# Patient Record
Sex: FEMALE | Race: WHITE | NOT HISPANIC OR LATINO | Employment: UNEMPLOYED | URBAN - METROPOLITAN AREA
[De-identification: names, ages, dates, MRNs, and addresses within clinical notes are randomized per-mention and may not be internally consistent; named-entity substitution may affect disease eponyms.]

---

## 2017-09-13 ENCOUNTER — OFFICE VISIT (OUTPATIENT)
Dept: FAMILY MEDICINE | Facility: CLINIC | Age: 37
End: 2017-09-13
Payer: COMMERCIAL

## 2017-09-13 ENCOUNTER — TELEPHONE (OUTPATIENT)
Dept: FAMILY MEDICINE | Facility: CLINIC | Age: 37
End: 2017-09-13

## 2017-09-13 VITALS
SYSTOLIC BLOOD PRESSURE: 118 MMHG | OXYGEN SATURATION: 99 % | WEIGHT: 174.81 LBS | HEART RATE: 79 BPM | HEIGHT: 67 IN | DIASTOLIC BLOOD PRESSURE: 86 MMHG | BODY MASS INDEX: 27.44 KG/M2 | RESPIRATION RATE: 16 BRPM

## 2017-09-13 DIAGNOSIS — M25.40 SWELLING OF MULTIPLE JOINTS: Primary | ICD-10-CM

## 2017-09-13 PROCEDURE — 99213 OFFICE O/P EST LOW 20 MIN: CPT | Mod: 25,S$GLB,, | Performed by: FAMILY MEDICINE

## 2017-09-13 PROCEDURE — 96372 THER/PROPH/DIAG INJ SC/IM: CPT | Mod: S$GLB,,, | Performed by: FAMILY MEDICINE

## 2017-09-13 PROCEDURE — 99999 PR PBB SHADOW E&M-EST. PATIENT-LVL III: CPT | Mod: PBBFAC,,, | Performed by: FAMILY MEDICINE

## 2017-09-13 PROCEDURE — 3008F BODY MASS INDEX DOCD: CPT | Mod: S$GLB,,, | Performed by: FAMILY MEDICINE

## 2017-09-13 RX ORDER — BETAMETHASONE SODIUM PHOSPHATE AND BETAMETHASONE ACETATE 3; 3 MG/ML; MG/ML
6 INJECTION, SUSPENSION INTRA-ARTICULAR; INTRALESIONAL; INTRAMUSCULAR; SOFT TISSUE
Status: COMPLETED | OUTPATIENT
Start: 2017-09-13 | End: 2017-09-13

## 2017-09-13 RX ORDER — SPIRONOLACTONE 25 MG/1
25 TABLET ORAL 2 TIMES DAILY
COMMUNITY
End: 2019-07-23

## 2017-09-13 RX ORDER — OMEPRAZOLE 40 MG/1
40 CAPSULE, DELAYED RELEASE ORAL DAILY
Qty: 14 CAPSULE | Refills: 0 | Status: SHIPPED | OUTPATIENT
Start: 2017-09-13 | End: 2018-03-07

## 2017-09-13 RX ORDER — CELECOXIB 400 MG/1
400 CAPSULE ORAL 2 TIMES DAILY
Qty: 28 CAPSULE | Refills: 0 | Status: SHIPPED | OUTPATIENT
Start: 2017-09-13 | End: 2017-12-04

## 2017-09-13 RX ADMIN — BETAMETHASONE SODIUM PHOSPHATE AND BETAMETHASONE ACETATE 6 MG: 3; 3 INJECTION, SUSPENSION INTRA-ARTICULAR; INTRALESIONAL; INTRAMUSCULAR; SOFT TISSUE at 09:09

## 2017-09-13 NOTE — PROGRESS NOTES
Subjective:       Patient ID: Haley Saravia is a 37 y.o. female.    Chief Complaint: Hip Pain (left hip; happens this time of year ); Knee Pain (both knees); and Hand Pain (both hands)    Patient presents with:  Hip Pain: left hip; happens this time of year   Knee Pain: both knees  Hand Pain: both hands      Here to f/u on joint pains.  This has gotten worse in the last 2 weeks.  Similar episode las tyear at this time.  Worse areas are left hip, right thumb, both knees.  No obvious sweling.  Joints feels more tight.  Hip xrays last year have been normal.          Past Medical History:   Diagnosis Date    Abnormal Pap smear          Acne     Dyspareunia     Encounter for long-term (current) use of other medications     GERD (gastroesophageal reflux disease)     Pelvic pain in female     LLQ    Post herpetic neuralgia        Past Surgical History:   Procedure Laterality Date    CYSTOSCOPY  1/11/2010    [facility]    ESOPHAGOGASTRODUODENOSCOPY  6/05/2009    Dr [facility] Caballero [facility]    HYSTERECTOMY  1/2015    LEEP PROCEDURE      SALPINGOOPHORECTOMY  1/11/2010    Left - Dr [facility]    TONSILLECTOMY  07/10/2008    Dr DIANA Avila - [facility]       Review of patient's allergies indicates:   Allergen Reactions    Codeine Diarrhea    Medrol [methylprednisolone] Other (See Comments)     Uncontrollable coughing fits       Social History     Social History    Marital status:      Spouse name: N/A    Number of children: 1    Years of education: N/A     Occupational History    unemployed      Social History Main Topics    Smoking status: Former Smoker     Packs/day: 0.50     Years: 3.00    Smokeless tobacco: Never Used    Alcohol use No    Drug use: No    Sexual activity: Yes     Partners: Male     Birth control/ protection: OCP     Other Topics Concern    Not on file     Social History Narrative    Exercise: 3 days a week with        Current Outpatient Prescriptions  "on File Prior to Visit   Medication Sig Dispense Refill    EPIDUO 0.1-2.5 % topical gel       esomeprazole (NEXIUM) 20 MG capsule Take 20 mg by mouth before breakfast.      multivitamin (THERAGRAN) per tablet Take 1 tablet by mouth once daily.      vitamin E 400 UNIT capsule Take 400 Units by mouth once daily.      [DISCONTINUED] meloxicam (MOBIC) 15 MG tablet Take 1 tablet (15 mg total) by mouth once daily. 30 tablet 5    [DISCONTINUED] naproxen (NAPROSYN) 500 MG tablet Take 1 tablet (500 mg total) by mouth 2 (two) times daily with meals. 30 tablet 0     No current facility-administered medications on file prior to visit.        Family History   Problem Relation Age of Onset    Heart disease Father     Breast cancer Neg Hx     Ovarian cancer Neg Hx        Review of Systems   Constitutional: Negative for activity change and unexpected weight change.   HENT: Negative for hearing loss, rhinorrhea and trouble swallowing.    Eyes: Negative for discharge and visual disturbance.   Respiratory: Negative for chest tightness and wheezing.    Cardiovascular: Negative for chest pain and palpitations.   Gastrointestinal: Negative for blood in stool, constipation, diarrhea and vomiting.   Endocrine: Negative for polydipsia and polyuria.   Genitourinary: Negative for difficulty urinating, dysuria, hematuria and menstrual problem.   Musculoskeletal: Positive for arthralgias. Negative for joint swelling and neck pain.   Neurological: Negative for weakness and headaches.   Psychiatric/Behavioral: Negative for confusion and dysphoric mood.       Objective:      /86 (BP Location: Right arm, Patient Position: Sitting, BP Method: Medium (Manual))   Pulse 79   Resp 16   Ht 5' 7" (1.702 m)   Wt 79.3 kg (174 lb 13.2 oz)   LMP 12/08/2014   SpO2 99%   BMI 27.38 kg/m²   Physical Exam   Constitutional: She is oriented to person, place, and time. She appears well-developed and well-nourished.   HENT:   Head: Normocephalic. "   Mouth/Throat: Oropharynx is clear and moist. No oropharyngeal exudate or posterior oropharyngeal erythema.   Eyes: Conjunctivae and EOM are normal. Pupils are equal, round, and reactive to light.   Neck: Normal range of motion. Neck supple. No thyromegaly present.   Cardiovascular: Normal rate, regular rhythm, S1 normal, S2 normal, normal heart sounds and intact distal pulses.  Exam reveals no gallop and no friction rub.    No murmur heard.  Pulmonary/Chest: Effort normal and breath sounds normal. She has no wheezes. She has no rales.   Abdominal: Normal appearance.   Musculoskeletal:        Left hip: She exhibits normal range of motion.        Right knee: She exhibits normal range of motion. Tenderness found. Medial joint line and lateral joint line tenderness noted.        Left knee: She exhibits normal range of motion. Tenderness found. Medial joint line and lateral joint line tenderness noted.        Hands:       Right lower leg: She exhibits no edema.        Left lower leg: She exhibits no edema.        Legs:  Lymphadenopathy:     She has no cervical adenopathy.   Neurological: She is alert and oriented to person, place, and time. No cranial nerve deficit. Gait normal.   Skin: Skin is warm and intact. No rash noted.   Psychiatric: She has a normal mood and affect.       Results for orders placed or performed in visit on 09/29/16   TSH   Result Value Ref Range    TSH 2.962 0.400 - 4.000 uIU/mL   CBC auto differential   Result Value Ref Range    WBC 8.24 3.90 - 12.70 K/uL    RBC 4.79 4.00 - 5.40 M/uL    Hemoglobin 15.3 12.0 - 16.0 g/dL    Hematocrit 44.1 37.0 - 48.5 %    MCV 92 82 - 98 fL    MCH 31.9 (H) 27.0 - 31.0 pg    MCHC 34.7 32.0 - 36.0 %    RDW 12.5 11.5 - 14.5 %    Platelets 328 150 - 350 K/uL    MPV 9.7 9.2 - 12.9 fL    Gran # 4.8 1.8 - 7.7 K/uL    Lymph # 2.6 1.0 - 4.8 K/uL    Mono # 0.7 0.3 - 1.0 K/uL    Eos # 0.1 0.0 - 0.5 K/uL    Baso # 0.04 0.00 - 0.20 K/uL    Gran% 57.6 38.0 - 73.0 %    Lymph%  31.1 18.0 - 48.0 %    Mono% 8.9 4.0 - 15.0 %    Eosinophil% 1.7 0.0 - 8.0 %    Basophil% 0.5 0.0 - 1.9 %    Differential Method Automated    APTT   Result Value Ref Range    aPTT 23.9 21.0 - 32.0 sec   Protime-INR   Result Value Ref Range    Prothrombin Time 9.8 9.0 - 12.5 sec    INR 0.9 0.8 - 1.2       Assessment:       1. Swelling of multiple joints        Plan:       Swelling of multiple joints  -     omeprazole (PRILOSEC) 40 MG capsule; Take 1 capsule (40 mg total) by mouth once daily.  Dispense: 14 capsule; Refill: 0  -     celecoxib (CELEBREX) 400 MG capsule; Take 1 capsule (400 mg total) by mouth 2 (two) times daily.  Dispense: 28 capsule; Refill: 0  -     betamethasone acetate-betamethasone sodium phosphate injection 6 mg; Inject 1 mL (6 mg total) into the muscle one time.      likely mild arthritis flare caused by weather  Basic joint stretching and relative rest  NSAID treatment for 1-2 weeks  F/u PRN

## 2017-09-13 NOTE — TELEPHONE ENCOUNTER
Celebrex 400 mg not available, the have Celebrex 200 mg on hand, asking if can dispense Celebrex 200 mg, quantity 56, 2 capsules twice daily.  Patient's copay will not be affected.  Please advise.

## 2017-09-13 NOTE — TELEPHONE ENCOUNTER
----- Message from [place] sent at 9/13/2017  1:12 PM CDT -----  Contact: pharmacy   Wants dose changed on RX   .  [id number] [place] - [address]  [address]  [place] [address]  Phone: [phone] Fax: [phone]     celecoxib (CELEBREX) 400 MG capsule

## 2017-12-04 ENCOUNTER — OFFICE VISIT (OUTPATIENT)
Dept: FAMILY MEDICINE | Facility: CLINIC | Age: 37
End: 2017-12-04
Payer: COMMERCIAL

## 2017-12-04 VITALS
OXYGEN SATURATION: 99 % | HEIGHT: 67 IN | SYSTOLIC BLOOD PRESSURE: 106 MMHG | WEIGHT: 169.44 LBS | RESPIRATION RATE: 16 BRPM | TEMPERATURE: 99 F | DIASTOLIC BLOOD PRESSURE: 74 MMHG | HEART RATE: 73 BPM | BODY MASS INDEX: 26.6 KG/M2

## 2017-12-04 DIAGNOSIS — B02.29 POST HERPETIC NEURALGIA: ICD-10-CM

## 2017-12-04 DIAGNOSIS — R52 PAIN: ICD-10-CM

## 2017-12-04 DIAGNOSIS — B02.8 HERPES ZOSTER WITH COMPLICATION: Primary | ICD-10-CM

## 2017-12-04 PROCEDURE — 99214 OFFICE O/P EST MOD 30 MIN: CPT | Mod: S$GLB,,, | Performed by: NURSE PRACTITIONER

## 2017-12-04 PROCEDURE — 99999 PR PBB SHADOW E&M-EST. PATIENT-LVL IV: CPT | Mod: PBBFAC,,, | Performed by: NURSE PRACTITIONER

## 2017-12-04 RX ORDER — HYDROCODONE BITARTRATE AND ACETAMINOPHEN 7.5; 325 MG/1; MG/1
TABLET ORAL
Refills: 0 | COMMUNITY
Start: 2017-11-09 | End: 2017-12-04 | Stop reason: DRUGHIGH

## 2017-12-04 RX ORDER — HYDROCODONE BITARTRATE AND ACETAMINOPHEN 10; 325 MG/1; MG/1
1 TABLET ORAL EVERY 6 HOURS PRN
Qty: 20 TABLET | Refills: 0 | Status: SHIPPED | OUTPATIENT
Start: 2017-12-04 | End: 2017-12-07 | Stop reason: SDUPTHER

## 2017-12-04 RX ORDER — ALBUTEROL SULFATE 90 UG/1
AEROSOL, METERED RESPIRATORY (INHALATION)
Refills: 2 | COMMUNITY
Start: 2017-10-22 | End: 2018-03-07

## 2017-12-04 RX ORDER — VALACYCLOVIR HYDROCHLORIDE 1 G/1
1000 TABLET, FILM COATED ORAL EVERY 8 HOURS
Qty: 21 TABLET | Refills: 1 | Status: SHIPPED | OUTPATIENT
Start: 2017-12-04 | End: 2018-04-24 | Stop reason: SDUPTHER

## 2017-12-04 NOTE — PROGRESS NOTES
Subjective:       Patient ID: Haley Saravia is a 37 y.o. female.    Chief Complaint: Herpes Zoster (rash started Saturday; left back and side )    June 2017 -shingles on face   Has post herpetic neuralgia in the past - took pain meds and has gotten them at age 25      Rash   This is a recurrent problem. The current episode started in the past 7 days. The problem has been gradually worsening since onset. The affected locations include theback. The rash is characterized by blistering, burning, pain and itchiness. She was exposed to nothing. Pertinent negatives include no anorexia, congestion, cough, diarrhea, eye pain, facial edema, fatigue, fever, joint pain, nail changes, rhinorrhea, shortness of breath, sore throat or vomiting. Past treatments include analgesics. The treatment provided mild relief. Her past medical history is significant for varicella. There is no history of allergies, asthma or eczema.     Vitals:    12/04/17 0954   BP: 106/74   Pulse: 73   Resp: 16   Temp: 98.6 °F (37 °C)     Review of Systems   Constitutional: Negative.  Negative for diaphoresis, fatigue and fever.   HENT: Negative.  Negative for congestion, rhinorrhea and sore throat.    Eyes: Negative.  Negative for pain.   Respiratory: Negative.  Negative for cough, shortness of breath and wheezing.    Cardiovascular: Negative.  Negative for chest pain.   Gastrointestinal: Negative.  Negative for abdominal pain, anorexia, diarrhea, nausea and vomiting.   Endocrine: Negative.    Genitourinary: Negative.  Negative for dysuria, hematuria and menstrual problem.   Musculoskeletal: Positive for joint swelling. Negative for joint pain.   Skin: Positive for rash. Negative for color change and nail changes.   Allergic/Immunologic: Negative.    Neurological: Positive for numbness. Negative for speech difficulty.   Hematological: Negative.    Psychiatric/Behavioral: Negative.        Past Medical History:   Diagnosis Date    Abnormal Pap smear           Acne     Dyspareunia     Encounter for long-term (current) use of other medications     GERD (gastroesophageal reflux disease)     Pelvic pain in female     LLQ    Post herpetic neuralgia      Objective:      Physical Exam   Constitutional: She is oriented to person, place, and time. She appears well-developed and well-nourished.   HENT:   Head: Normocephalic and atraumatic.   Mouth/Throat: Oropharynx is clear and moist.   Eyes: Conjunctivae and EOM are normal. Pupils are equal, round, and reactive to light.   Neck: Neck supple.   Cardiovascular: Normal rate, regular rhythm, normal heart sounds and intact distal pulses.    Pulmonary/Chest: Effort normal and breath sounds normal.   Abdominal: Soft. Bowel sounds are normal.   Musculoskeletal: Normal range of motion.   Neurological: She is alert and oriented to person, place, and time.   Skin: Skin is warm and dry. Purpura and rash noted. Rash is vesicular.        Psychiatric: She has a normal mood and affect. Her behavior is normal.   Nursing note and vitals reviewed.      Assessment:       1. Herpes zoster with complication    2. Post herpetic neuralgia    3. Pain        Plan:       Herpes zoster with complication  -     valACYclovir (VALTREX) 1000 MG tablet; Take 1 tablet (1,000 mg total) by mouth every 8 (eight) hours.  Dispense: 21 tablet; Refill: 1  -     hydrocodone-acetaminophen 10-325mg (NORCO)  mg Tab; Take 1 tablet by mouth every 6 (six) hours as needed for Pain.  Dispense: 20 tablet; Refill: 0    Post herpetic neuralgia  Pain    Will give norco for acute pain - she states that she has tried other post neuralgia meds and all have had side effects with her that were not manageable   Only wants to use norco at this time for very short time to get over acute flare   Will call if not better

## 2017-12-07 DIAGNOSIS — B02.8 HERPES ZOSTER WITH COMPLICATION: ICD-10-CM

## 2017-12-07 NOTE — TELEPHONE ENCOUNTER
----- Message from Liat Gonzales sent at 12/7/2017  4:15 PM CST -----  Contact: self  Patient called regarding previous appt on Monday for shingles. Advised if more medication is needed with have Dr. Kiran to forward to pharmacy. Please contact [phone] (home)       [facility] [id number] [facility] - [place] - [address]  [address]  [facility] [address]  Phone: [phone] Fax: [phone]

## 2017-12-07 NOTE — TELEPHONE ENCOUNTER
Spoke to patient. Patient states she needs more medication for pain for shingles before the weekend. She states she still has some left but didn't want to run out over the weekend. Please advise.

## 2017-12-08 RX ORDER — HYDROCODONE BITARTRATE AND ACETAMINOPHEN 10; 325 MG/1; MG/1
1 TABLET ORAL EVERY 6 HOURS PRN
Qty: 20 TABLET | Refills: 0 | Status: SHIPPED | OUTPATIENT
Start: 2017-12-08 | End: 2017-12-12 | Stop reason: SDUPTHER

## 2017-12-12 DIAGNOSIS — B02.8 HERPES ZOSTER WITH COMPLICATION: ICD-10-CM

## 2017-12-12 NOTE — TELEPHONE ENCOUNTER
Pt is requesting another refill on Norco. Pt has 1 day left of pills from the rx filled on 12-7-17. Pt is leaving [facility] marko afternoon and would like refill before then .--lp

## 2017-12-13 RX ORDER — HYDROCODONE BITARTRATE AND ACETAMINOPHEN 10; 325 MG/1; MG/1
1 TABLET ORAL EVERY 6 HOURS PRN
Qty: 20 TABLET | Refills: 0 | Status: SHIPPED | OUTPATIENT
Start: 2017-12-13 | End: 2018-03-07

## 2018-03-07 ENCOUNTER — OFFICE VISIT (OUTPATIENT)
Dept: FAMILY MEDICINE | Facility: CLINIC | Age: 38
End: 2018-03-07
Payer: COMMERCIAL

## 2018-03-07 VITALS
TEMPERATURE: 98 F | SYSTOLIC BLOOD PRESSURE: 118 MMHG | RESPIRATION RATE: 16 BRPM | WEIGHT: 164 LBS | HEART RATE: 69 BPM | DIASTOLIC BLOOD PRESSURE: 76 MMHG | HEIGHT: 67 IN | BODY MASS INDEX: 25.74 KG/M2 | OXYGEN SATURATION: 98 %

## 2018-03-07 DIAGNOSIS — Z12.39 SCREENING FOR BREAST CANCER: ICD-10-CM

## 2018-03-07 DIAGNOSIS — M25.562 CHRONIC PAIN OF BOTH KNEES: Primary | ICD-10-CM

## 2018-03-07 DIAGNOSIS — Z00.00 PREVENTATIVE HEALTH CARE: ICD-10-CM

## 2018-03-07 DIAGNOSIS — K21.9 GASTROESOPHAGEAL REFLUX DISEASE, ESOPHAGITIS PRESENCE NOT SPECIFIED: ICD-10-CM

## 2018-03-07 DIAGNOSIS — M25.561 CHRONIC PAIN OF BOTH KNEES: Primary | ICD-10-CM

## 2018-03-07 DIAGNOSIS — G89.29 CHRONIC PAIN OF BOTH KNEES: Primary | ICD-10-CM

## 2018-03-07 PROCEDURE — 99214 OFFICE O/P EST MOD 30 MIN: CPT | Mod: S$GLB,,, | Performed by: NURSE PRACTITIONER

## 2018-03-07 PROCEDURE — 99999 PR PBB SHADOW E&M-EST. PATIENT-LVL V: CPT | Mod: PBBFAC,,, | Performed by: NURSE PRACTITIONER

## 2018-03-07 RX ORDER — ESOMEPRAZOLE MAGNESIUM 40 MG/1
40 CAPSULE, DELAYED RELEASE ORAL
Qty: 30 CAPSULE | Refills: 0 | Status: SHIPPED | OUTPATIENT
Start: 2018-03-07 | End: 2018-04-04 | Stop reason: SDUPTHER

## 2018-03-07 RX ORDER — CELECOXIB 400 MG/1
400 CAPSULE ORAL 2 TIMES DAILY
Qty: 28 CAPSULE | Refills: 0 | Status: SHIPPED | OUTPATIENT
Start: 2018-03-07 | End: 2018-04-24

## 2018-03-07 NOTE — PROGRESS NOTES
Subjective:       Patient ID: Haley Saravia is a 38 y.o. female.    Chief Complaint: Knee Pain (both knees )    Went skiing last week - just got home and feels that she aggravated her knees   She states the short term celebrex for 1-2 weeks worked for her in the past         Knee Pain    The incident occurred more than 1 week ago. The incident occurred at home. The pain is present in the right knee and left knee. The quality of the pain is described as aching and cramping. The pain is at a severity of 6/10. The pain is moderate. The pain has been fluctuating since onset. Pertinent negatives include no inability to bear weight, loss of motion, loss of sensation, muscle weakness, numbness or tingling. The symptoms are aggravated by movement. She has tried rest for the symptoms. The treatment provided no relief.     Vitals:    03/07/18 0903   BP: 118/76   Pulse: 69   Resp: 16   Temp: 98.1 °F (36.7 °C)     Review of Systems   Constitutional: Positive for activity change. Negative for diaphoresis, fatigue, fever and unexpected weight change.   HENT: Negative.  Negative for hearing loss, rhinorrhea and trouble swallowing.    Eyes: Negative.  Negative for discharge and visual disturbance.   Respiratory: Negative.  Negative for cough, chest tightness, shortness of breath and wheezing.    Cardiovascular: Negative.  Negative for chest pain and palpitations.   Gastrointestinal: Negative.  Negative for abdominal pain, blood in stool, constipation, diarrhea, nausea and vomiting.   Endocrine: Negative.  Negative for polydipsia and polyuria.   Genitourinary: Negative.  Negative for difficulty urinating, dysuria, hematuria and menstrual problem.   Musculoskeletal: Positive for arthralgias and joint swelling. Negative for neck pain.   Skin: Negative for color change and rash.   Allergic/Immunologic: Negative.    Neurological: Negative for tingling, speech difficulty, weakness, numbness and headaches.   Hematological:  Negative.    Psychiatric/Behavioral: Negative.  Negative for confusion and dysphoric mood.       Past Medical History:   Diagnosis Date    Abnormal Pap smear          Acne     Dyspareunia     Encounter for long-term (current) use of other medications     GERD (gastroesophageal reflux disease)     Pelvic pain in female     LLQ    Post herpetic neuralgia      Objective:      Physical Exam   Constitutional: She is oriented to person, place, and time. She appears well-developed and well-nourished.   HENT:   Head: Normocephalic and atraumatic.   Mouth/Throat: Oropharynx is clear and moist.   Eyes: Conjunctivae and EOM are normal. Pupils are equal, round, and reactive to light.   Neck: Neck supple.   Cardiovascular: Normal rate.    Pulmonary/Chest: Effort normal.   Musculoskeletal:        Right knee: She exhibits decreased range of motion. She exhibits no swelling and no effusion. No tenderness found. No medial joint line and no lateral joint line tenderness noted.        Left knee: She exhibits decreased range of motion. She exhibits no swelling and no effusion.   Neurological: She is alert and oriented to person, place, and time.   Skin: Skin is warm and dry.   Psychiatric: She has a normal mood and affect. Her behavior is normal. Judgment and thought content normal.   Nursing note and vitals reviewed.      Assessment:       1. Chronic pain of both knees    2. Gastroesophageal reflux disease, esophagitis presence not specified    3. Preventative health care    4. Screening for breast cancer        Plan:       Chronic pain of both knees  -     celecoxib (CELEBREX) 400 MG capsule; Take 1 capsule (400 mg total) by mouth 2 (two) times daily.  Dispense: 28 capsule; Refill: 0    Gastroesophageal reflux disease, esophagitis presence not specified  -     esomeprazole (NEXIUM) 40 MG capsule; Take 1 capsule (40 mg total) by mouth before breakfast.  Dispense: 30 capsule; Refill: 0    Preventative health care  -     [facility] auto  differential; Future; Expected date: 03/07/2018  -     Comprehensive metabolic panel; Future; Expected date: 03/07/2018  -     Lipid panel; Future; Expected date: 03/07/2018    Screening for breast cancer  -     Mammo Digital Screening Bilat with CAD; Future; Expected date: 03/07/2018            Will make an appt with Dr Webb for check up and FU with labs prior

## 2018-03-13 ENCOUNTER — HOSPITAL ENCOUNTER (OUTPATIENT)
Dept: RADIOLOGY | Facility: HOSPITAL | Age: 38
Discharge: HOME | End: 2018-03-13
Attending: NURSE PRACTITIONER
Payer: COMMERCIAL

## 2018-03-13 DIAGNOSIS — Z12.39 SCREENING FOR BREAST CANCER: ICD-10-CM

## 2018-03-13 PROCEDURE — 77067 SCR MAMMO BI INCL CAD: CPT | Mod: 26,,, | Performed by: RADIOLOGY

## 2018-03-13 PROCEDURE — 77063 BREAST TOMOSYNTHESIS BI: CPT | Mod: 26,,, | Performed by: RADIOLOGY

## 2018-03-13 PROCEDURE — 77067 SCR MAMMO BI INCL CAD: CPT | Mod: TC,PO

## 2018-04-04 DIAGNOSIS — K21.9 GASTROESOPHAGEAL REFLUX DISEASE, ESOPHAGITIS PRESENCE NOT SPECIFIED: ICD-10-CM

## 2018-04-04 RX ORDER — ESOMEPRAZOLE MAGNESIUM 40 MG/1
40 CAPSULE, DELAYED RELEASE ORAL
Qty: 30 CAPSULE | Refills: 0 | Status: SHIPPED | OUTPATIENT
Start: 2018-04-04 | End: 2019-07-23

## 2018-04-21 ENCOUNTER — LAB VISIT (OUTPATIENT)
Dept: LAB | Facility: HOSPITAL | Age: 38
End: 2018-04-21
Attending: FAMILY MEDICINE
Payer: COMMERCIAL

## 2018-04-21 DIAGNOSIS — Z00.00 PREVENTATIVE HEALTH CARE: ICD-10-CM

## 2018-04-21 LAB
ALBUMIN SERPL BCP-MCNC: 4 G/DL
ALP SERPL-CCNC: 39 U/L
ALT SERPL W/O P-5'-P-CCNC: 13 U/L
ANION GAP SERPL CALC-SCNC: 7 MMOL/L
AST SERPL-CCNC: 16 U/L
BASOPHILS # BLD AUTO: 0.06 K/UL
BASOPHILS NFR BLD: 0.7 %
BILIRUB SERPL-MCNC: 0.6 MG/DL
BUN SERPL-MCNC: 13 MG/DL
CALCIUM SERPL-MCNC: 9.5 MG/DL
CHLORIDE SERPL-SCNC: 102 MMOL/L
CHOLEST SERPL-MCNC: 272 MG/DL
CHOLEST/HDLC SERPL: 4.5 {RATIO}
CO2 SERPL-SCNC: 25 MMOL/L
CREAT SERPL-MCNC: 0.8 MG/DL
DIFFERENTIAL METHOD: NORMAL
EOSINOPHIL # BLD AUTO: 0.2 K/UL
EOSINOPHIL NFR BLD: 1.9 %
ERYTHROCYTE [DISTWIDTH] IN BLOOD BY AUTOMATED COUNT: 12.3 %
EST. GFR  (AFRICAN AMERICAN): >60 ML/MIN/1.73 M^2
EST. GFR  (NON AFRICAN AMERICAN): >60 ML/MIN/1.73 M^2
GLUCOSE SERPL-MCNC: 90 MG/DL
HCT VFR BLD AUTO: 42.4 %
HDLC SERPL-MCNC: 60 MG/DL
HDLC SERPL: 22.1 %
HGB BLD-MCNC: 14.3 G/DL
IMM GRANULOCYTES # BLD AUTO: 0.02 K/UL
IMM GRANULOCYTES NFR BLD AUTO: 0.2 %
LDLC SERPL CALC-MCNC: 168.4 MG/DL
LYMPHOCYTES # BLD AUTO: 2.9 K/UL
LYMPHOCYTES NFR BLD: 34.2 %
MCH RBC QN AUTO: 31 PG
MCHC RBC AUTO-ENTMCNC: 33.7 G/DL
MCV RBC AUTO: 92 FL
MONOCYTES # BLD AUTO: 0.6 K/UL
MONOCYTES NFR BLD: 6.7 %
NEUTROPHILS # BLD AUTO: 4.8 K/UL
NEUTROPHILS NFR BLD: 56.3 %
NONHDLC SERPL-MCNC: 212 MG/DL
NRBC BLD-RTO: 0 /100 WBC
PLATELET # BLD AUTO: 319 K/UL
PMV BLD AUTO: 9.8 FL
POTASSIUM SERPL-SCNC: 4.2 MMOL/L
PROT SERPL-MCNC: 7.5 G/DL
RBC # BLD AUTO: 4.61 M/UL
SODIUM SERPL-SCNC: 134 MMOL/L
TRIGL SERPL-MCNC: 218 MG/DL
WBC # BLD AUTO: 8.47 K/UL

## 2018-04-21 PROCEDURE — 80053 COMPREHEN METABOLIC PANEL: CPT

## 2018-04-21 PROCEDURE — 85025 COMPLETE CBC W/AUTO DIFF WBC: CPT

## 2018-04-21 PROCEDURE — 80061 LIPID PANEL: CPT

## 2018-04-21 PROCEDURE — 36415 COLL VENOUS BLD VENIPUNCTURE: CPT | Mod: PO

## 2018-04-24 ENCOUNTER — OFFICE VISIT (OUTPATIENT)
Dept: FAMILY MEDICINE | Facility: CLINIC | Age: 38
End: 2018-04-24
Payer: COMMERCIAL

## 2018-04-24 VITALS
RESPIRATION RATE: 18 BRPM | HEART RATE: 66 BPM | WEIGHT: 163.56 LBS | OXYGEN SATURATION: 99 % | BODY MASS INDEX: 25.67 KG/M2 | TEMPERATURE: 99 F | HEIGHT: 67 IN | DIASTOLIC BLOOD PRESSURE: 64 MMHG | SYSTOLIC BLOOD PRESSURE: 110 MMHG

## 2018-04-24 DIAGNOSIS — B02.8 HERPES ZOSTER WITH COMPLICATION: ICD-10-CM

## 2018-04-24 DIAGNOSIS — Z00.00 PREVENTATIVE HEALTH CARE: Primary | ICD-10-CM

## 2018-04-24 PROCEDURE — 99999 PR PBB SHADOW E&M-EST. PATIENT-LVL III: CPT | Mod: PBBFAC,,, | Performed by: FAMILY MEDICINE

## 2018-04-24 PROCEDURE — 99396 PREV VISIT EST AGE 40-64: CPT | Mod: S$GLB,,, | Performed by: FAMILY MEDICINE

## 2018-04-24 RX ORDER — VALACYCLOVIR HYDROCHLORIDE 1 G/1
1000 TABLET, FILM COATED ORAL DAILY
Qty: 90 TABLET | Refills: 3 | Status: SHIPPED | OUTPATIENT
Start: 2018-04-24 | End: 2019-04-02 | Stop reason: SDUPTHER

## 2018-04-24 NOTE — PROGRESS NOTES
Subjective:       Patient ID: Haley Saravia is a 38 y.o. female.    Chief Complaint: Preventative health care    Here for general exam.    Still getting left flank pain which comes and goes related to the shingles.        Past Medical History:   Diagnosis Date    Abnormal Pap smear          Acne     Dyspareunia     Encounter for long-term (current) use of other medications     GERD (gastroesophageal reflux disease)     Pelvic pain in female     LLQ    Post herpetic neuralgia        Past Surgical History:   Procedure Laterality Date    CYSTOSCOPY  1/11/2010    Dr [facility]    ESOPHAGOGASTRODUODENOSCOPY  6/05/2009    Dr [facility] Caballero - [facility]    HYSTERECTOMY  1/2015    LEEP PROCEDURE      SALPINGOOPHORECTOMY  1/11/2010    Left - Dr Kendell [facility] - [facility]    TONSILLECTOMY  07/10/2008    Dr DIANA [facility] - [facility]       Review of patient's allergies indicates:   Allergen Reactions    Codeine Diarrhea    Medrol [methylprednisolone] Other (See Comments)     Uncontrollable coughing fits       Social History     Social History    Marital status:      Spouse name: N/A    Number of children: 1    Years of education: N/A     Occupational History    unemployed      Social History Main Topics    Smoking status: Former Smoker     Packs/day: 0.50     Years: 3.00    Smokeless tobacco: Never Used    Alcohol use No    Drug use: No    Sexual activity: Yes     Partners: Male     Birth control/ protection: OCP     Other Topics Concern    Not on file     Social History Narrative    Exercise: 3 days a week with        Current Outpatient Prescriptions on File Prior to Visit   Medication Sig Dispense Refill    EPIDUO 0.1-2.5 % topical gel       esomeprazole (NEXIUM) 40 MG capsule TAKE 1 CAPSULE (40 MG TOTAL) BY MOUTH BEFORE BREAKFAST. 30 capsule 0    multivitamin (THERAGRAN) per tablet Take 1 tablet by mouth once daily.      spironolactone (ALDACTONE) 25 MG tablet Take 25 mg by mouth 2 (two)  "times daily.      vitamin E 400 UNIT capsule Take 400 Units by mouth once daily.      [DISCONTINUED] celecoxib (CELEBREX) 400 MG capsule Take 1 capsule (400 mg total) by mouth 2 (two) times daily. 28 capsule 0    [DISCONTINUED] valACYclovir (VALTREX) 1000 MG tablet Take 1 tablet (1,000 mg total) by mouth every 8 (eight) hours. 21 tablet 1     No current facility-administered medications on file prior to visit.        Family History   Problem Relation Age of Onset    Heart disease Father     Breast cancer Neg Hx     Ovarian cancer Neg Hx        Review of Systems   Constitutional: Negative for appetite change, chills, fever and unexpected weight change.   HENT: Negative for sore throat and trouble swallowing.    Eyes: Negative for pain and visual disturbance.   Respiratory: Negative for cough, shortness of breath and wheezing.    Cardiovascular: Negative for chest pain and palpitations.   Gastrointestinal: Negative for abdominal pain, blood in stool, diarrhea, nausea and vomiting.   Genitourinary: Negative for difficulty urinating, dysuria and hematuria.   Musculoskeletal: Positive for arthralgias (knees). Negative for gait problem and neck pain.   Skin: Negative for rash and wound.   Neurological: Negative for dizziness, weakness, numbness and headaches.   Hematological: Negative for adenopathy.   Psychiatric/Behavioral: Negative for dysphoric mood.       Objective:      /64   Pulse 66   Temp 98.6 °F (37 °C) (Oral)   Resp 18   Ht 5' 7" (1.702 m)   Wt 74.2 kg (163 lb 9.3 oz)   LMP 12/08/2014   SpO2 99%   BMI 25.62 kg/m²   Physical Exam   Constitutional: She is oriented to person, place, and time. She appears well-developed and well-nourished.   HENT:   Head: Normocephalic.   Mouth/Throat: Oropharynx is clear and moist. No oropharyngeal exudate or posterior oropharyngeal erythema.   Eyes: Conjunctivae and EOM are normal. Pupils are equal, round, and reactive to light.   Neck: Normal range of motion. " Neck supple. No thyromegaly present.   Cardiovascular: Normal rate, regular rhythm, S1 normal, S2 normal, normal heart sounds and intact distal pulses.  Exam reveals no gallop and no friction rub.    No murmur heard.  Pulmonary/Chest: Effort normal and breath sounds normal. She has no wheezes. She has no rales.   Abdominal: Normal appearance.   Musculoskeletal:        Right lower leg: She exhibits no edema.        Left lower leg: She exhibits no edema.   Lymphadenopathy:     She has no cervical adenopathy.   Neurological: She is alert and oriented to person, place, and time. No cranial nerve deficit. Gait normal.   Skin: Skin is warm and intact. No rash noted.   Psychiatric: She has a normal mood and affect.         Results for orders placed or performed in visit on 04/21/18   CBC auto differential   Result Value Ref Range    WBC 8.47 3.90 - 12.70 K/uL    RBC 4.61 4.00 - 5.40 M/uL    Hemoglobin 14.3 12.0 - 16.0 g/dL    Hematocrit 42.4 37.0 - 48.5 %    MCV 92 82 - 98 fL    MCH 31.0 27.0 - 31.0 pg    MCHC 33.7 32.0 - 36.0 g/dL    RDW 12.3 11.5 - 14.5 %    Platelets 319 150 - 350 K/uL    MPV 9.8 9.2 - 12.9 fL    Immature Granulocytes 0.2 0.0 - 0.5 %    Gran # (ANC) 4.8 1.8 - 7.7 K/uL    Immature Grans (Abs) 0.02 0.00 - 0.04 K/uL    Lymph # 2.9 1.0 - 4.8 K/uL    Mono # 0.6 0.3 - 1.0 K/uL    Eos # 0.2 0.0 - 0.5 K/uL    Baso # 0.06 0.00 - 0.20 K/uL    nRBC 0 0 /100 WBC    Gran% 56.3 38.0 - 73.0 %    Lymph% 34.2 18.0 - 48.0 %    Mono% 6.7 4.0 - 15.0 %    Eosinophil% 1.9 0.0 - 8.0 %    Basophil% 0.7 0.0 - 1.9 %    Differential Method Automated    Comprehensive metabolic panel   Result Value Ref Range    Sodium 134 (L) 136 - 145 mmol/L    Potassium 4.2 3.5 - 5.1 mmol/L    Chloride 102 95 - 110 mmol/L    CO2 25 23 - 29 mmol/L    Glucose 90 70 - 110 mg/dL    BUN, Bld 13 6 - 20 mg/dL    Creatinine 0.8 0.5 - 1.4 mg/dL    Calcium 9.5 8.7 - 10.5 mg/dL    Total Protein 7.5 6.0 - 8.4 g/dL    Albumin 4.0 3.5 - 5.2 g/dL    Total  Bilirubin 0.6 0.1 - 1.0 mg/dL    Alkaline Phosphatase 39 (L) 55 - 135 U/L    AST 16 10 - 40 U/L    ALT 13 10 - 44 U/L    Anion Gap 7 (L) 8 - 16 mmol/L    eGFR if African American >60.0 >60 mL/min/1.73 m^2    eGFR if non African American >60.0 >60 mL/min/1.73 m^2   Lipid panel   Result Value Ref Range    Cholesterol 272 (H) 120 - 199 mg/dL    Triglycerides 218 (H) 30 - 150 mg/dL    HDL 60 40 - 75 mg/dL    LDL Cholesterol 168.4 (H) 63.0 - 159.0 mg/dL    HDL/Chol Ratio 22.1 20.0 - 50.0 %    Total Cholesterol/HDL Ratio 4.5 2.0 - 5.0    Non-HDL Cholesterol 212 mg/dL       Assessment:       1. Preventative health care    2. Herpes zoster with complication        Plan:       Preventative health care    Herpes zoster with complication  -     valACYclovir (VALTREX) 1000 MG tablet; Take 1 tablet (1,000 mg total) by mouth once daily.  Dispense: 90 tablet; Refill: 3        Ok to try capsacin for post-herpetic pain.  Ok to try mobic for knee pains  Continue low-fat diet and regular exercise  Counseled on regular exercise, maintenance of a healthy weight, balanced diet rich in fruits/vegetables and lean protein, and avoidance of unhealthy habits like smoking and excessive alcohol intake.

## 2018-08-28 ENCOUNTER — HOSPITAL ENCOUNTER (OUTPATIENT)
Dept: RADIOLOGY | Facility: HOSPITAL | Age: 38
Discharge: HOME | End: 2018-08-28
Attending: FAMILY MEDICINE
Payer: COMMERCIAL

## 2018-08-28 ENCOUNTER — OFFICE VISIT (OUTPATIENT)
Dept: FAMILY MEDICINE | Facility: CLINIC | Age: 38
End: 2018-08-28
Payer: COMMERCIAL

## 2018-08-28 ENCOUNTER — OFFICE VISIT (OUTPATIENT)
Dept: PODIATRY | Facility: CLINIC | Age: 38
End: 2018-08-28
Payer: COMMERCIAL

## 2018-08-28 VITALS — WEIGHT: 173.06 LBS | HEIGHT: 67 IN | BODY MASS INDEX: 27.16 KG/M2

## 2018-08-28 VITALS
OXYGEN SATURATION: 97 % | SYSTOLIC BLOOD PRESSURE: 116 MMHG | TEMPERATURE: 98 F | RESPIRATION RATE: 16 BRPM | DIASTOLIC BLOOD PRESSURE: 82 MMHG | BODY MASS INDEX: 27.16 KG/M2 | HEART RATE: 79 BPM | HEIGHT: 67 IN | WEIGHT: 173.06 LBS

## 2018-08-28 DIAGNOSIS — M79.672 LEFT FOOT PAIN: ICD-10-CM

## 2018-08-28 DIAGNOSIS — M21.621 TAILOR'S BUNION OF BOTH FEET: ICD-10-CM

## 2018-08-28 DIAGNOSIS — M21.6X2 EQUINUS DEFORMITY OF BOTH FEET: ICD-10-CM

## 2018-08-28 DIAGNOSIS — M20.11 VALGUS DEFORMITY OF BOTH GREAT TOES: ICD-10-CM

## 2018-08-28 DIAGNOSIS — M21.41 PES PLANUS OF BOTH FEET: ICD-10-CM

## 2018-08-28 DIAGNOSIS — M21.622 TAILOR'S BUNION OF BOTH FEET: ICD-10-CM

## 2018-08-28 DIAGNOSIS — M20.42 HAMMER TOES OF BOTH FEET: ICD-10-CM

## 2018-08-28 DIAGNOSIS — M21.42 PES PLANUS OF BOTH FEET: ICD-10-CM

## 2018-08-28 DIAGNOSIS — B34.9 VIRAL SYNDROME: Primary | ICD-10-CM

## 2018-08-28 DIAGNOSIS — M20.12 VALGUS DEFORMITY OF BOTH GREAT TOES: ICD-10-CM

## 2018-08-28 DIAGNOSIS — M21.6X1 EQUINUS DEFORMITY OF BOTH FEET: ICD-10-CM

## 2018-08-28 DIAGNOSIS — M21.70 LOWER LIMB LENGTH DIFFERENCE: ICD-10-CM

## 2018-08-28 DIAGNOSIS — M77.8 EXTENSOR TENDINITIS OF FOOT: Primary | ICD-10-CM

## 2018-08-28 DIAGNOSIS — M20.41 HAMMER TOES OF BOTH FEET: ICD-10-CM

## 2018-08-28 LAB
FLUAV AG SPEC QL IA: NEGATIVE
FLUBV AG SPEC QL IA: NEGATIVE
SPECIMEN SOURCE: NORMAL

## 2018-08-28 PROCEDURE — 87400 INFLUENZA A/B EACH AG IA: CPT | Mod: 59,PO

## 2018-08-28 PROCEDURE — 99999 PR PBB SHADOW E&M-EST. PATIENT-LVL IV: CPT | Mod: PBBFAC,,, | Performed by: FAMILY MEDICINE

## 2018-08-28 PROCEDURE — 99203 OFFICE O/P NEW LOW 30 MIN: CPT | Mod: S$GLB,,, | Performed by: PODIATRIST

## 2018-08-28 PROCEDURE — 73630 X-RAY EXAM OF FOOT: CPT | Mod: TC,FY,PO,LT

## 2018-08-28 PROCEDURE — 99999 PR PBB SHADOW E&M-EST. PATIENT-LVL III: CPT | Mod: PBBFAC,,, | Performed by: PODIATRIST

## 2018-08-28 PROCEDURE — 99213 OFFICE O/P EST LOW 20 MIN: CPT | Mod: S$GLB,,, | Performed by: FAMILY MEDICINE

## 2018-08-28 PROCEDURE — 73630 X-RAY EXAM OF FOOT: CPT | Mod: 26,LT,, | Performed by: RADIOLOGY

## 2018-08-28 PROCEDURE — 3008F BODY MASS INDEX DOCD: CPT | Mod: CPTII,S$GLB,, | Performed by: PODIATRIST

## 2018-08-28 PROCEDURE — 3008F BODY MASS INDEX DOCD: CPT | Mod: CPTII,S$GLB,, | Performed by: FAMILY MEDICINE

## 2018-08-28 NOTE — LETTER
September 27, 2018      Shawn Kiran MD  [address] [facility] [address] [place] [address]           [facility]  [address] [facility] [address] [place] [address]  Phone: [phone]          Patient: Haley Saravia   MR Number: [id number]   YOB: 1980   Date of Visit: 8/28/2018       Dear Dr. Shawn Kiran:    Thank you for referring Haley Saravia to me for evaluation. Attached you will find relevant portions of my assessment and plan of care.    If you have questions, please do not hesitate to call me. I look forward to following Haley Saravia along with you.    Sincerely,    Manjinder Balderrama  CC:  No Recipients    If you would like to receive this communication electronically, please contact [email or web address] or [phone] to request more information on [employer] Link access.    For providers and/or their staff who would like to refer a patient to [facility], please contact us through our one-stop-shop provider referral line, [facility], at [phone].    If you feel you have received this communication in error or would no longer like to receive these types of communications, please e-mail [email or web address]

## 2018-08-28 NOTE — PROGRESS NOTES
"Subjective:       Patient ID: Haley Saravia is a 38 y.o. female.    Chief Complaint: Foot Pain (left foot ); Headache; Sore Throat; and Generalized Body Aches    C/o 24 hour of achiness, ST, PND, cough.    C/o 2 month h/o left midfoot pain.  It started after walking in [place]  It is worse walking barefoot.  Pain is aching.  Pain 5-8/10.  No swelling, redness or heat.  Pain is better when wearing wedges or tennis shoes.        Past Medical History:   Diagnosis Date    Abnormal Pap smear          Acne     Dyspareunia     Encounter for long-term (current) use of other medications     GERD (gastroesophageal reflux disease)     Pelvic pain in female     LLQ    Post herpetic neuralgia        Past Surgical History:   Procedure Laterality Date    CYSTOSCOPY  1/11/2010    Dr Kendell [facility] - [facility]    ESOPHAGOGASTRODUODENOSCOPY  6/05/2009    Dr Mark Caballero - [facility]    HYSTERECTOMY  1/2015    LEEP PROCEDURE      SALPINGOOPHORECTOMY  1/11/2010    Left - Dr Kendell Carty - [facility]    TONSILLECTOMY  07/10/2008    Dr DIANA Avila - [facility]       Review of patient's allergies indicates:   Allergen Reactions    Codeine Diarrhea    Medrol [methylprednisolone] Other (See Comments)     Uncontrollable coughing fits       Social History     Socioeconomic History    Marital status:      Spouse name: Not on file    Number of children: 1    Years of education: Not on file    Highest education level: Not on file   Social Needs    Financial resource strain: Not on file    Food insecurity - worry: Not on file    Food insecurity - inability: Not on file    Transportation needs - medical: Not on file    Transportation needs - non-medical: Not on file   Occupational History    Occupation: unemployed   Tobacco Use    Smoking status: Former Smoker     Packs/day: 0.50     Years: 3.00     Pack years: 1.50    Smokeless tobacco: Never Used   Substance and Sexual Activity    Alcohol use: No    Drug use: " No    Sexual activity: Yes     Partners: Male     Birth control/protection: OCP   Other Topics Concern    Not on file   Social History Narrative    Exercise: 3 days a week with        Current Outpatient Medications on File Prior to Visit   Medication Sig Dispense Refill    esomeprazole (NEXIUM) 40 MG capsule TAKE 1 CAPSULE (40 MG TOTAL) BY MOUTH BEFORE BREAKFAST. 30 capsule 0    multivitamin (THERAGRAN) per tablet Take 1 tablet by mouth once daily.      spironolactone (ALDACTONE) 25 MG tablet Take 25 mg by mouth 2 (two) times daily.      valACYclovir (VALTREX) 1000 MG tablet Take 1 tablet (1,000 mg total) by mouth once daily. 90 tablet 3    vitamin E 400 UNIT capsule Take 400 Units by mouth once daily.      [DISCONTINUED] EPIDUO 0.1-2.5 % topical gel        No current facility-administered medications on file prior to visit.        Family History   Problem Relation Age of Onset    Heart disease Father     Breast cancer Neg Hx     Ovarian cancer Neg Hx        Review of Systems   Constitutional: Positive for chills. Negative for appetite change, fever and unexpected weight change.   HENT: Positive for sneezing and sore throat. Negative for trouble swallowing.    Eyes: Negative for pain and visual disturbance.   Respiratory: Positive for cough. Negative for shortness of breath and wheezing.    Cardiovascular: Negative for chest pain and palpitations.   Gastrointestinal: Negative for abdominal pain, blood in stool, diarrhea, nausea and vomiting.   Genitourinary: Negative for difficulty urinating, dysuria and hematuria.   Musculoskeletal: Positive for arthralgias (left foot) and myalgias. Negative for gait problem and neck pain.   Skin: Negative for rash and wound.   Neurological: Negative for dizziness, weakness, numbness and headaches.   Hematological: Negative for adenopathy.   Psychiatric/Behavioral: Negative for dysphoric mood.       Objective:      /82 (BP Location: Left arm, Patient  "Position: Sitting, BP Method: Medium (Manual))   Pulse 79   Temp 98.1 °F (36.7 °C) (Oral)   Resp 16   Ht 5' 7" (1.702 m)   Wt 78.5 kg (173 lb 1 oz)   LMP 12/08/2014   SpO2 97%   BMI 27.11 kg/m²   Physical Exam   Constitutional: She appears well-developed and well-nourished.   HENT:   Head: Normocephalic and atraumatic.   Right Ear: Tympanic membrane, external ear and ear canal normal.   Left Ear: Hearing, tympanic membrane and external ear normal.   Nose: Nose normal. No rhinorrhea or septal deviation. Right sinus exhibits no maxillary sinus tenderness and no frontal sinus tenderness. Left sinus exhibits no maxillary sinus tenderness and no frontal sinus tenderness.   Mouth/Throat: Oropharynx is clear and moist and mucous membranes are normal. No oropharyngeal exudate, posterior oropharyngeal erythema or tonsillar abscesses.   Eyes: Conjunctivae and EOM are normal. Pupils are equal, round, and reactive to light.   Neck: Normal range of motion. Neck supple.   Cardiovascular: Normal rate, regular rhythm, normal heart sounds and intact distal pulses.   Pulmonary/Chest: Effort normal and breath sounds normal. She has no wheezes. She has no rales.   Musculoskeletal:        Left ankle: Normal.        Left foot: There is normal range of motion, no swelling and no deformity.        Feet:    Lymphadenopathy:     She has no cervical adenopathy.       Flu swab negative  Left foot xray with no signs of fracture.    Assessment:       1. Viral syndrome    2. Left foot pain        Plan:       Viral syndrome  -     Influenza antigen Nasal Swab    Left foot pain  -     X-Ray Foot Complete Left; Future; Expected date: 08/28/2018  -     Ambulatory referral to Podiatry      Patient advised to pursue rest, increase fluids, take OTC multi-symptom cold relief medication of choice, and exercise contact precautions.  Podiatry appointment today for eval of persistent foot pain  "

## 2018-09-28 NOTE — PROGRESS NOTES
Subjective:      Patient ID: Haley Saravia is a 38 y.o. female.    Chief Complaint: Foot Problem (left foot - across top) and Other Misc (PCP:  Dr Kiran 8/28/18)    Haley is a 38 y.o. female who presents to the podiatry clinic  with complaint of  left foot pain. Onset of the symptoms was Around July 4th holiday. Precipitating event: increased activity and [place] around airports [place]. Current symptoms include: ability to bear weight, but with some pain, pain at the lateral aspect of the ankle, pain with eversion of the foot, swelling and worsening symptoms after a period of activity. Aggravating factors: any weight bearing, kneeling, lateral movements, pivoting, rising after sitting, squatting and walking. Symptoms have waxed and waned but are worse overall and Increased again over this past weekend while traveling through airports. Patient has had no prior foot problems. Evaluation to date: plain films: normal. Treatment to date: avoidance of offending activity and rest. Patients rates pain 5/10 on pain scale.  She denies any known history of trauma.  Patient denies any other pedal complaints at this time.      Review of Systems   Constitution: Negative for chills, diaphoresis and fever.   Cardiovascular: Negative for claudication, cyanosis and leg swelling.   Respiratory: Negative for cough, shortness of breath and wheezing.    Endocrine: Negative for cold intolerance, heat intolerance, polydipsia, polyphagia and polyuria.   Hematologic/Lymphatic: Negative for bleeding problem. Does not bruise/bleed easily.   Skin: Negative for color change, dry skin, itching, nail changes, poor wound healing, rash, skin cancer, suspicious lesions and unusual hair distribution.   Musculoskeletal: Positive for myalgias. Negative for arthritis, back pain, falls, gout, joint pain, joint swelling, muscle cramps, muscle weakness and stiffness.   Gastrointestinal: Negative for change in bowel habit,  constipation, diarrhea, nausea and vomiting.   Neurological: Positive for paresthesias. Negative for disturbances in coordination, dizziness, focal weakness, loss of balance, numbness and sensory change.           Objective:      Bilateral pedal exam was performed today.  Physical Exam   Constitutional: She is oriented to person, place, and time. She appears well-developed and well-nourished. No distress.   HENT:   Head: Normocephalic and atraumatic.   Eyes: Conjunctivae and EOM are normal. Pupils are equal, round, and reactive to light.   Neck: Normal range of motion.   Cardiovascular:   Pulses:       Dorsalis pedis pulses are 2+ on the right side, and 2+ on the left side.        Posterior tibial pulses are 2+ on the right side, and 2+ on the left side.   Pedal pulses palpable 2/4 DP & PT Bilateral LE.  CFT is < 3 seconds to Bilateral hallux.  Skin temperature is warm to warm proximal tibia to distal toes Bilateral LE without localized increase in calor noted.  Mild, nonpitting edema noted to the left lateral foot extending from the 5th MTPJ to the ankle posterior to the lateral malleolus.  No erythema or ecchymosis noted Bilateral foot or ankle.  Hair growth present distally Bilateral LE.     Pulmonary/Chest: Effort normal and breath sounds normal. No respiratory distress. She has no wheezes.   Musculoskeletal: She exhibits edema, tenderness and deformity.        Right foot: There is decreased range of motion and deformity.        Left foot: There is decreased range of motion and deformity.   Bilateral ankle joint ROM is decreased.  Bilateral subtalar joint ROM is decreased.  Bilateral midtarsal joint ROM is within normal limits.  Bilateral 1st ray ROM is within normal limits.  Bilateral 1st  MTPJ ROM is decreased.   Bilateral ankle joint dorsiflexion is restricted with the knee extended and flexed per Silfverskiold exam.    Muscle strength is 5/5 for all bilateral muscle groups tested. Bilateral hallux is  abducted on the 1st ray.    Large dorsomedial prominence noted to the 1st MTPJ bilateral foot.    ROM in the aligned position is decreased with ROM in the track bound position decreased.  Bilateral 5th digit is abducted on the 5th ray.   Dorsal lateral bony prominence noted to the lateral aspect of the 5th MTPJ bilateral foot.  Bilateral 5th digit is noted exhibiting flexion contracture with adductovarus component.  Flexion contracture of digits 2-5 noted B/L foot.  Flat foot type present B/L foot.  Limb length discrepancy appreciable - left limb shorter than right limb.   Feet:   Right Foot:   Protective Sensation: 10 sites tested. 10 sites sensed.   Skin Integrity: Negative for ulcer, blister, skin breakdown, erythema, warmth, callus or dry skin.   Left Foot:   Protective Sensation: 10 sites tested. 10 sites sensed.   Skin Integrity: Negative for ulcer, blister, skin breakdown, erythema, warmth, callus or dry skin.   Neurological: She is alert and oriented to person, place, and time. She has normal strength. She displays no atrophy and normal reflexes. No sensory deficit. She exhibits normal muscle tone. Coordination and gait normal. She displays no Babinski's sign on the right side. She displays no Babinski's sign on the left side.   Reflex Scores:       Achilles reflexes are 2+ on the right side and 2+ on the left side.  Protective sensation is intact to 10/10 sites on the right foot and to 10/10 sites on the left foot via [place]-Lulu monofilament.    Proprioception is Intact to the right foot and Intact to the left foot.  Vibratory sensation is Intact to the right foot and Intact to the left foot.   Light touch sensation is Intact to the right foot and Intact to the left foot.   Achilles DTR and Chaddock STR are Intact to bilateral lower extremities.   Negative Babinski sign bilateral lower extremities.  Negative clonus sign bilateral lower extremities.  Mild-to-moderate tenderness to palpation noted to  the lateral aspect of the left foot and ankle along the course of the lateral cutaneous nerve of the foot.  Negative Tinel's and Valleiux signs noted to left lateral cutaneous nerve of the foot.     Skin: Skin is warm, dry and intact. Capillary refill takes less than 2 seconds. No abrasion, no bruising, no burn, no ecchymosis, no laceration, no lesion, no petechiae and no rash noted. She is not diaphoretic. No cyanosis or erythema. Nails show no clubbing.   Toenails 1-5 B/L are WNL in length and thickness.  Webspaces 1-4 B/L are clean, dry, and intact.  Skin turgor is supple.  No dry, flaky skin noted to B/L LE.  No open wounds or suspicious pigmented lesions appreciable B/L foot or ankle.     Psychiatric: She has a normal mood and affect. Her behavior is normal. Judgment and thought content normal.   Nursing note and vitals reviewed.        Assessment:       Encounter Diagnoses   Name Primary?    Extensor tendinitis of foot - Left Foot Yes    Left foot pain - Left Foot     Lower limb length difference     Valgus deformity of both great toes     Tailor's bunion of both feet     Hammer toes of both feet     Equinus deformity of both feet     Pes planus of both feet          Plan:       Haley was seen today for foot problem and other misc.    Diagnoses and all orders for this visit:    Extensor tendinitis of foot - Left Foot  -     ORTHOTIC DEVICE (DME)    Left foot pain - Left Foot  -     ORTHOTIC DEVICE (DME)    Lower limb length difference  -     ORTHOTIC DEVICE (DME)    Valgus deformity of both great toes  -     ORTHOTIC DEVICE (DME)    Tailor's bunion of both feet  -     ORTHOTIC DEVICE (DME)    Hammer toes of both feet  -     ORTHOTIC DEVICE (DME)    Equinus deformity of both feet  -     ORTHOTIC DEVICE (DME)    Pes planus of both feet  -     ORTHOTIC DEVICE (DME)      I counseled the patient on her conditions, their implications and medical management.    - Discussed with patient diagnoses, prognoses,  and treatment options.    - Educated patient on PRICE therapy and proper supportive, accommodative shoes.    - Prescribed custom orthotics.    Patient was given the following recommendations and instructions:  Patient Instructions   - Wear supportive shoes such as sneakers with arch supports.    - Look for Superfeet or Powerstep arch supports.    - Rest/reduce ambulation to necessary activities of daily living.    - Ice heel for no more than 20 minutes/per hour.    - Elevate foot as much as possible throughout the day.    - Take OTC NSAIDs as directed for pain and swelling.    - Try OTC topical arnica for pain relief.    - Schedule appointment for custom orthotics.    - Notify clinic if pain worsens or fails to improve.    - Follow up in 2 weeks for foot pain.          Patria Modi DPM

## 2018-09-28 NOTE — PATIENT INSTRUCTIONS
- Wear supportive shoes such as sneakers with arch supports.    - Look for Superfeet or Powerstep arch supports.    - Rest/reduce ambulation to necessary activities of daily living.    - Ice heel for no more than 20 minutes/per hour.    - Elevate foot as much as possible throughout the day.    - Take OTC NSAIDs as directed for pain and swelling.    - Try OTC topical arnica for pain relief.    - Schedule appointment for custom orthotics.    - Notify clinic if pain worsens or fails to improve.    - Follow up in 2 weeks for foot pain.

## 2019-04-02 DIAGNOSIS — B02.8 HERPES ZOSTER WITH COMPLICATION: ICD-10-CM

## 2019-04-03 RX ORDER — VALACYCLOVIR HYDROCHLORIDE 1 G/1
1000 TABLET, FILM COATED ORAL DAILY
Qty: 90 TABLET | Refills: 0 | Status: SHIPPED | OUTPATIENT
Start: 2019-04-03 | End: 2019-10-16 | Stop reason: SDUPTHER

## 2019-04-03 NOTE — PROGRESS NOTES
Refill Authorization Note     is requesting a refill authorization.    Brief assessment and rationale for refill: DEFER: unclear indication, dose adjustment  Name and strength of medication: VALACYCLOVIR HCL 1 GRAM TABLET  Medication-related problems identified:   No indication for a medication  Dose adjustment    Medication Therapy Plan: Prescribed for hx of zoster of face; typically suppressive therapy reserved for HSV infections of mucocutaneous/genital; please clarify 1g daily for HVZ is not a indicaiton at this dosing                   How patient will take medication: t1t po daily           Comments:   Lab Results   Component Value Date    CREATININE 0.8 04/21/2018    BUN 13 04/21/2018     (L) 04/21/2018    K 4.2 04/21/2018     04/21/2018    CO2 25 04/21/2018      Lab Results   Component Value Date    ALT 13 04/21/2018    AST 16 04/21/2018    ALKPHOS 39 (L) 04/21/2018    BILITOT 0.6 04/21/2018      Lab Results   Component Value Date    WBC 8.47 04/21/2018    HGB 14.3 04/21/2018    HCT 42.4 04/21/2018    MCV 92 04/21/2018     04/21/2018

## 2019-07-22 NOTE — PROGRESS NOTES
Subjective:       Patient ID: Haely Saravia is a 39 y.o. female.    Chief Complaint: GI Problem (cramping, burning pain also in upper gastric, mild constipation off and on, noticed abt 2 months ago. currently taking pepcid, has noticed headaches now.)    Here today with taking nexium since 2015 - she states that this was working until few weeks ago   Tried omeprazole and pepcid ac - no relief of symptoms and tried pepcid and mylanta with no relief   She has lost 20 lbs -feeling good with weight loss  No new meds - add xyzal for allergies     Gastroesophageal Reflux   She complains of abdominal pain, globus sensation, heartburn and nausea. She reports no chest pain, no choking, no coughing, no dysphagia, no early satiety, no hoarse voice, no sore throat, no stridor, no tooth decay, no water brash or no wheezing. This is a chronic problem. The current episode started more than 1 month ago. The problem occurs constantly. The problem has been gradually worsening. The heartburn is located in the substernum. The heartburn is of moderate intensity. The heartburn wakes her from sleep. The heartburn limits her activity. The heartburn changes with position. The symptoms are aggravated by certain foods and lying down. Pertinent negatives include no anemia, fatigue, melena, muscle weakness, orthopnea or weight loss. She has tried a PPI, a histamine-2 antagonist and an antacid for the symptoms.     Vitals:    07/23/19 1421   BP: 122/80   Pulse: 79   Resp: 18   Temp: 98.1 °F (36.7 °C)     Review of Systems   Constitutional: Positive for appetite change. Negative for diaphoresis, fatigue, fever and weight loss.   HENT: Negative.  Negative for hoarse voice and sore throat.    Eyes: Negative.    Respiratory: Negative.  Negative for cough, choking, shortness of breath and wheezing.    Cardiovascular: Negative.  Negative for chest pain.   Gastrointestinal: Positive for abdominal pain, heartburn and nausea. Negative for  diarrhea, dysphagia and melena.        Worse with eating, worse with stomach empty   Belching    Endocrine: Negative.    Genitourinary: Negative.  Negative for dysuria and hematuria.   Musculoskeletal: Negative.  Negative for muscle weakness.   Skin: Negative.  Negative for color change and rash.   Allergic/Immunologic: Negative.    Neurological: Negative.  Negative for speech difficulty and numbness.   Hematological: Negative.    Psychiatric/Behavioral: Negative.        Past Medical History:   Diagnosis Date    Abnormal Pap smear          Acne     Dyspareunia     Encounter for long-term (current) use of other medications     GERD (gastroesophageal reflux disease)     Pelvic pain in female     LLQ    Post herpetic neuralgia        Objective:      Physical Exam   Constitutional: She is oriented to person, place, and time. She appears well-developed and well-nourished.   HENT:   Head: Normocephalic and atraumatic.   Right Ear: Hearing, tympanic membrane, external ear and ear canal normal.   Left Ear: Hearing, tympanic membrane, external ear and ear canal normal.   Nose: Nose normal. No mucosal edema, rhinorrhea or sinus tenderness. Right sinus exhibits no maxillary sinus tenderness and no frontal sinus tenderness. Left sinus exhibits no maxillary sinus tenderness and no frontal sinus tenderness.   Mouth/Throat: Uvula is midline, oropharynx is clear and moist and mucous membranes are normal. No oropharyngeal exudate or posterior oropharyngeal edema.   Eyes: Pupils are equal, round, and reactive to light. Conjunctivae and EOM are normal.   Neck: Normal range of motion. Neck supple.   Cardiovascular: Normal rate, regular rhythm, normal heart sounds and intact distal pulses. Exam reveals no friction rub.   No murmur heard.  Pulmonary/Chest: Effort normal and breath sounds normal. No stridor. No respiratory distress. She has no wheezes. She has no rales.   Abdominal: Soft. Bowel sounds are increased. There is  tenderness in the epigastric area.   Musculoskeletal: Normal range of motion. She exhibits no edema or tenderness.   Neurological: She is alert and oriented to person, place, and time. No cranial nerve deficit. Coordination normal.   Skin: Skin is warm and dry.   Psychiatric: She has a normal mood and affect. Her behavior is normal. Judgment and thought content normal.   Nursing note and vitals reviewed.      Assessment:       1. Epigastric pain    2. Belching    3. Gastroesophageal reflux disease, esophagitis presence not specified    4. Anorexia    5. Nausea    6. Screening for breast cancer        Plan:       Epigastric pain  -     Case request GI: ESOPHAGOGASTRODUODENOSCOPY (EGD)  -     RABEprazole (ACIPHEX) 20 mg tablet; Take 1 tablet (20 mg total) by mouth once daily.  Dispense: 30 tablet; Refill: 11  -     Comprehensive metabolic panel; Future; Expected date: 07/23/2019  -     CBC auto differential; Future; Expected date: 07/23/2019    Belching  -     Case request GI: ESOPHAGOGASTRODUODENOSCOPY (EGD)  -     RABEprazole (ACIPHEX) 20 mg tablet; Take 1 tablet (20 mg total) by mouth once daily.  Dispense: 30 tablet; Refill: 11  -     Comprehensive metabolic panel; Future; Expected date: 07/23/2019  -     CBC auto differential; Future; Expected date: 07/23/2019    Gastroesophageal reflux disease, esophagitis presence not specified  -     Case request GI: ESOPHAGOGASTRODUODENOSCOPY (EGD)  -     RABEprazole (ACIPHEX) 20 mg tablet; Take 1 tablet (20 mg total) by mouth once daily.  Dispense: 30 tablet; Refill: 11  -     Comprehensive metabolic panel; Future; Expected date: 07/23/2019  -     CBC auto differential; Future; Expected date: 07/23/2019    Anorexia  -     Case request GI: ESOPHAGOGASTRODUODENOSCOPY (EGD)  -     RABEprazole (ACIPHEX) 20 mg tablet; Take 1 tablet (20 mg total) by mouth once daily.  Dispense: 30 tablet; Refill: 11  -     Comprehensive metabolic panel; Future; Expected date: 07/23/2019  -     CBC  auto differential; Future; Expected date: 07/23/2019    Nausea  -     Case request GI: ESOPHAGOGASTRODUODENOSCOPY (EGD)  -     RABEprazole (ACIPHEX) 20 mg tablet; Take 1 tablet (20 mg total) by mouth once daily.  Dispense: 30 tablet; Refill: 11  -     Comprehensive metabolic panel; Future; Expected date: 07/23/2019  -     CBC auto differential; Future; Expected date: 07/23/2019    Screening for breast cancer  -     Mammo Digital Screening Bilateral With CAD; Future; Expected date: 07/23/2019        Discussed starting aciphex again and can use pepcid at night.   Would like to get EGD based on pain she has.     Will call with results

## 2019-07-23 ENCOUNTER — OFFICE VISIT (OUTPATIENT)
Dept: FAMILY MEDICINE | Facility: CLINIC | Age: 39
End: 2019-07-23
Payer: COMMERCIAL

## 2019-07-23 ENCOUNTER — LAB VISIT (OUTPATIENT)
Dept: LAB | Facility: HOSPITAL | Age: 39
End: 2019-07-23
Attending: NURSE PRACTITIONER
Payer: COMMERCIAL

## 2019-07-23 VITALS
TEMPERATURE: 98 F | OXYGEN SATURATION: 98 % | RESPIRATION RATE: 18 BRPM | SYSTOLIC BLOOD PRESSURE: 122 MMHG | HEIGHT: 67 IN | BODY MASS INDEX: 26.26 KG/M2 | WEIGHT: 167.31 LBS | HEART RATE: 79 BPM | DIASTOLIC BLOOD PRESSURE: 80 MMHG

## 2019-07-23 DIAGNOSIS — K21.9 GASTROESOPHAGEAL REFLUX DISEASE, ESOPHAGITIS PRESENCE NOT SPECIFIED: ICD-10-CM

## 2019-07-23 DIAGNOSIS — R14.2 BELCHING: ICD-10-CM

## 2019-07-23 DIAGNOSIS — R63.0 ANOREXIA: ICD-10-CM

## 2019-07-23 DIAGNOSIS — R10.13 EPIGASTRIC PAIN: Primary | ICD-10-CM

## 2019-07-23 DIAGNOSIS — R11.0 NAUSEA: ICD-10-CM

## 2019-07-23 DIAGNOSIS — Z12.39 SCREENING FOR BREAST CANCER: ICD-10-CM

## 2019-07-23 DIAGNOSIS — R10.13 EPIGASTRIC PAIN: ICD-10-CM

## 2019-07-23 LAB
ALBUMIN SERPL BCP-MCNC: 4.3 G/DL (ref 3.5–5.2)
ALP SERPL-CCNC: 49 U/L (ref 55–135)
ALT SERPL W/O P-5'-P-CCNC: 15 U/L (ref 10–44)
ANION GAP SERPL CALC-SCNC: 8 MMOL/L (ref 8–16)
AST SERPL-CCNC: 19 U/L (ref 10–40)
BASOPHILS # BLD AUTO: 0.05 K/UL (ref 0–0.2)
BASOPHILS NFR BLD: 0.5 % (ref 0–1.9)
BILIRUB SERPL-MCNC: 0.2 MG/DL (ref 0.1–1)
BUN SERPL-MCNC: 14 MG/DL (ref 6–20)
CALCIUM SERPL-MCNC: 9.7 MG/DL (ref 8.7–10.5)
CHLORIDE SERPL-SCNC: 101 MMOL/L (ref 95–110)
CO2 SERPL-SCNC: 26 MMOL/L (ref 23–29)
CREAT SERPL-MCNC: 1.3 MG/DL (ref 0.5–1.4)
DIFFERENTIAL METHOD: ABNORMAL
EOSINOPHIL # BLD AUTO: 0.2 K/UL (ref 0–0.5)
EOSINOPHIL NFR BLD: 1.8 % (ref 0–8)
ERYTHROCYTE [DISTWIDTH] IN BLOOD BY AUTOMATED COUNT: 11.9 % (ref 11.5–14.5)
EST. GFR  (AFRICAN AMERICAN): 58.9 ML/MIN/1.73 M^2
EST. GFR  (NON AFRICAN AMERICAN): 51.1 ML/MIN/1.73 M^2
GLUCOSE SERPL-MCNC: 78 MG/DL (ref 70–110)
HCT VFR BLD AUTO: 41.3 % (ref 37–48.5)
HGB BLD-MCNC: 13.6 G/DL (ref 12–16)
IMM GRANULOCYTES # BLD AUTO: 0.04 K/UL (ref 0–0.04)
IMM GRANULOCYTES NFR BLD AUTO: 0.4 % (ref 0–0.5)
LYMPHOCYTES # BLD AUTO: 2.1 K/UL (ref 1–4.8)
LYMPHOCYTES NFR BLD: 23.3 % (ref 18–48)
MCH RBC QN AUTO: 31.5 PG (ref 27–31)
MCHC RBC AUTO-ENTMCNC: 32.9 G/DL (ref 32–36)
MCV RBC AUTO: 96 FL (ref 82–98)
MONOCYTES # BLD AUTO: 0.6 K/UL (ref 0.3–1)
MONOCYTES NFR BLD: 6 % (ref 4–15)
NEUTROPHILS # BLD AUTO: 6.2 K/UL (ref 1.8–7.7)
NEUTROPHILS NFR BLD: 68 % (ref 38–73)
NRBC BLD-RTO: 0 /100 WBC
PLATELET # BLD AUTO: 419 K/UL (ref 150–350)
PMV BLD AUTO: 10.6 FL (ref 9.2–12.9)
POTASSIUM SERPL-SCNC: 4.9 MMOL/L (ref 3.5–5.1)
PROT SERPL-MCNC: 7.6 G/DL (ref 6–8.4)
RBC # BLD AUTO: 4.32 M/UL (ref 4–5.4)
SODIUM SERPL-SCNC: 135 MMOL/L (ref 136–145)
WBC # BLD AUTO: 9.14 K/UL (ref 3.9–12.7)

## 2019-07-23 PROCEDURE — 85025 COMPLETE CBC W/AUTO DIFF WBC: CPT

## 2019-07-23 PROCEDURE — 99214 OFFICE O/P EST MOD 30 MIN: CPT | Mod: S$GLB,,, | Performed by: NURSE PRACTITIONER

## 2019-07-23 PROCEDURE — 99999 PR PBB SHADOW E&M-EST. PATIENT-LVL III: ICD-10-PCS | Mod: PBBFAC,,, | Performed by: NURSE PRACTITIONER

## 2019-07-23 PROCEDURE — 3008F PR BODY MASS INDEX (BMI) DOCUMENTED: ICD-10-PCS | Mod: CPTII,S$GLB,, | Performed by: NURSE PRACTITIONER

## 2019-07-23 PROCEDURE — 36415 COLL VENOUS BLD VENIPUNCTURE: CPT | Mod: PO

## 2019-07-23 PROCEDURE — 3008F BODY MASS INDEX DOCD: CPT | Mod: CPTII,S$GLB,, | Performed by: NURSE PRACTITIONER

## 2019-07-23 PROCEDURE — 99214 PR OFFICE/OUTPT VISIT, EST, LEVL IV, 30-39 MIN: ICD-10-PCS | Mod: S$GLB,,, | Performed by: NURSE PRACTITIONER

## 2019-07-23 PROCEDURE — 99999 PR PBB SHADOW E&M-EST. PATIENT-LVL III: CPT | Mod: PBBFAC,,, | Performed by: NURSE PRACTITIONER

## 2019-07-23 PROCEDURE — 80053 COMPREHEN METABOLIC PANEL: CPT

## 2019-07-23 RX ORDER — SPIRONOLACTONE 50 MG/1
TABLET, FILM COATED ORAL
COMMUNITY
Start: 2019-07-21

## 2019-07-23 RX ORDER — RABEPRAZOLE SODIUM 20 MG/1
20 TABLET, DELAYED RELEASE ORAL DAILY
Qty: 30 TABLET | Refills: 11 | Status: SHIPPED | OUTPATIENT
Start: 2019-07-23 | End: 2020-06-23

## 2019-07-23 RX ORDER — FAMOTIDINE 20 MG/1
20 TABLET, FILM COATED ORAL 2 TIMES DAILY
COMMUNITY
End: 2021-02-17

## 2019-10-16 DIAGNOSIS — B02.8 HERPES ZOSTER WITH COMPLICATION: ICD-10-CM

## 2019-10-16 NOTE — PROGRESS NOTES
Refill Authorization Note     is requesting a refill authorization.    Brief assessment and rationale for refill: ROUTE: op   Name and strength of medication: VALACYCLOVIR HCL 1 GRAM TABLET  Medication-related problems identified: Requires appointment    Medication Therapy Plan: NCO by you recently; LOV(8/18); needs appt(ANNUAL); med outside of protocol; route to you     Medication reconciliation completed: No              How patient will take medication: t1t po qd           Comments:   Appointments past 12m or future 3m    Date Provider   Last Visit   8/28/2018 Shawn Kiran MD   Next Visit   Visit date not found Shawn Kiran MD

## 2019-10-17 RX ORDER — VALACYCLOVIR HYDROCHLORIDE 1 G/1
1000 TABLET, FILM COATED ORAL DAILY
Qty: 90 TABLET | Refills: 3 | Status: SHIPPED | OUTPATIENT
Start: 2019-10-17 | End: 2020-10-01 | Stop reason: SDUPTHER

## 2019-10-17 NOTE — TELEPHONE ENCOUNTER
Please see the following assessment. This refill request still requires some action on your part. Valacyclovir use not commented on recently. Need clarification on indication. Defer to you. Thank you.

## 2020-01-20 ENCOUNTER — PATIENT MESSAGE (OUTPATIENT)
Dept: FAMILY MEDICINE | Facility: CLINIC | Age: 40
End: 2020-01-20

## 2020-01-20 DIAGNOSIS — B02.29 POST HERPETIC NEURALGIA: Primary | ICD-10-CM

## 2020-01-22 ENCOUNTER — TELEPHONE (OUTPATIENT)
Dept: NEUROLOGY | Facility: CLINIC | Age: 40
End: 2020-01-22

## 2020-01-22 NOTE — TELEPHONE ENCOUNTER
Returned call to pt and scheduled neuropathy consult with Dr. Hancock. DAte/time/location confirmed; Appt added to waiting list. Verbalized understanding.

## 2020-01-22 NOTE — TELEPHONE ENCOUNTER
----- Message from Dez Castro sent at 1/22/2020  1:27 PM CST -----  [phone] pt needs appt for Post herpetic neuralgia- please call to schedule.   Thank you

## 2020-01-24 ENCOUNTER — TELEPHONE (OUTPATIENT)
Dept: GASTROENTEROLOGY | Facility: CLINIC | Age: 40
End: 2020-01-24

## 2020-02-03 ENCOUNTER — TELEPHONE (OUTPATIENT)
Dept: GASTROENTEROLOGY | Facility: CLINIC | Age: 40
End: 2020-02-03

## 2020-02-03 NOTE — TELEPHONE ENCOUNTER
I spoke with pt regarding ordered EGD and she states that right now she will not be scheduling the procedure. I informed her that we will cancel the order and notify her PCP. She verbalized understanding.

## 2020-02-04 ENCOUNTER — OFFICE VISIT (OUTPATIENT)
Dept: FAMILY MEDICINE | Facility: CLINIC | Age: 40
End: 2020-02-04
Payer: COMMERCIAL

## 2020-02-04 VITALS
OXYGEN SATURATION: 97 % | TEMPERATURE: 99 F | DIASTOLIC BLOOD PRESSURE: 68 MMHG | HEART RATE: 82 BPM | SYSTOLIC BLOOD PRESSURE: 118 MMHG | BODY MASS INDEX: 27.76 KG/M2 | WEIGHT: 177.25 LBS

## 2020-02-04 DIAGNOSIS — M54.16 LUMBAR RADICULOPATHY: Primary | ICD-10-CM

## 2020-02-04 DIAGNOSIS — B02.8 HERPES ZOSTER WITH COMPLICATION: ICD-10-CM

## 2020-02-04 PROCEDURE — 3008F PR BODY MASS INDEX (BMI) DOCUMENTED: ICD-10-PCS | Mod: CPTII,S$GLB,, | Performed by: FAMILY MEDICINE

## 2020-02-04 PROCEDURE — 99213 PR OFFICE/OUTPT VISIT, EST, LEVL III, 20-29 MIN: ICD-10-PCS | Mod: S$GLB,,, | Performed by: FAMILY MEDICINE

## 2020-02-04 PROCEDURE — 99999 PR PBB SHADOW E&M-EST. PATIENT-LVL III: ICD-10-PCS | Mod: PBBFAC,,, | Performed by: FAMILY MEDICINE

## 2020-02-04 PROCEDURE — 99213 OFFICE O/P EST LOW 20 MIN: CPT | Mod: S$GLB,,, | Performed by: FAMILY MEDICINE

## 2020-02-04 PROCEDURE — 99999 PR PBB SHADOW E&M-EST. PATIENT-LVL III: CPT | Mod: PBBFAC,,, | Performed by: FAMILY MEDICINE

## 2020-02-04 PROCEDURE — 3008F BODY MASS INDEX DOCD: CPT | Mod: CPTII,S$GLB,, | Performed by: FAMILY MEDICINE

## 2020-02-04 RX ORDER — HYDROCODONE BITARTRATE AND ACETAMINOPHEN 5; 325 MG/1; MG/1
1 TABLET ORAL EVERY 6 HOURS PRN
Qty: 30 TABLET | Refills: 0 | Status: SHIPPED | OUTPATIENT
Start: 2020-02-04 | End: 2020-02-11 | Stop reason: SDUPTHER

## 2020-02-04 RX ORDER — GABAPENTIN 300 MG/1
300 CAPSULE ORAL NIGHTLY
Qty: 30 CAPSULE | Refills: 1 | Status: SHIPPED | OUTPATIENT
Start: 2020-02-04 | End: 2020-02-09 | Stop reason: SDUPTHER

## 2020-02-04 NOTE — PROGRESS NOTES
"Subjective:       Patient ID: Haley Saravia is a 40 y.o. female.    Chief Complaint: Herpes Zoster and Foot Pain (believes its neuropathy )    1 month ago started with burning pain involving left foot.  She went to [facility] [place].  She was diagnosed with peripheral neuropathy and was advised to see neurology.    Last week started with rash in the left flank with pain consistent with previous shingles.    Also for the last week has had burning pain down the left leg and radiating down into the first.  Left leg is sensitive to touch.  Pain is 9/10 at worse at night.  It improves in the morning.  She feels like the pain is worsening.  She does admit to managing "sciatica" in the left leg for years.  She is taking ibupopren 800mg every 6 hours  Symptoms is worse with activity and with prolonged walking.    Hot water aggravates the pain in the flank and the leg.    She is taking valtrex 1000mg 2 daily.    Foot Pain   Associated symptoms include arthralgias (knees). Pertinent negatives include no abdominal pain, chest pain, chills, coughing, fever, headaches, nausea, neck pain, numbness, rash, sore throat, vomiting or weakness.       Past Medical History:   Diagnosis Date    Abnormal Pap smear          Acne     Dyspareunia     Encounter for long-term (current) use of other medications     GERD (gastroesophageal reflux disease)     Pelvic pain in female     LLQ    Post herpetic neuralgia        Past Surgical History:   Procedure Laterality Date    CYSTOSCOPY  1/11/2010    Dr Kendell [facility] - [facility]    ESOPHAGOGASTRODUODENOSCOPY  6/05/2009    Dr Mark Caballero - [facility]    HYSTERECTOMY  1/2015    LEEP PROCEDURE      SALPINGOOPHORECTOMY  1/11/2010    [facility]    TONSILLECTOMY  07/10/2008    Dr DIANA Avila - [facility]       Review of patient's allergies indicates:   Allergen Reactions    Codeine Diarrhea    Medrol [methylprednisolone] Other (See Comments)     Uncontrollable coughing fits       Social " History     Socioeconomic History    Marital status:      Spouse name: Not on file    Number of children: 1    Years of education: Not on file    Highest education level: Not on file   Occupational History    Occupation: unemployed   Social Needs    Financial resource strain: Not on file    Food insecurity:     Worry: Not on file     Inability: Not on file    Transportation needs:     Medical: Not on file     Non-medical: Not on file   Tobacco Use    Smoking status: Former Smoker     Packs/day: 0.50     Years: 3.00     Pack years: 1.50    Smokeless tobacco: Never Used   Substance and Sexual Activity    Alcohol use: No    Drug use: No    Sexual activity: Yes     Partners: Male     Birth control/protection: OCP   Lifestyle    Physical activity:     Days per week: Not on file     Minutes per session: Not on file    Stress: Not on file   Relationships    Social connections:     Talks on phone: Not on file     Gets together: Not on file     Attends Ep[employer] service: Not on file     Active member of club or organization: Not on file     Attends meetings of clubs or organizations: Not on file     Relationship status: Not on file   Other Topics Concern    Not on file   Social History Narrative    Exercise: 3 days a week with        Current Outpatient Medications on File Prior to Visit   Medication Sig Dispense Refill    famotidine (PEPCID) 20 MG tablet Take 20 mg by mouth 2 (two) times daily.      multivitamin (THERAGRAN) per tablet Take 1 tablet by mouth once daily.      RABEprazole (ACIPHEX) 20 mg tablet Take 1 tablet (20 mg total) by mouth once daily. 30 tablet 11    spironolactone (ALDACTONE) 50 MG tablet       valACYclovir (VALTREX) 1000 MG tablet TAKE 1 TABLET (1,000 MG TOTAL) BY MOUTH ONCE DAILY. 90 tablet 3    vitamin E 400 UNIT capsule Take 400 Units by mouth once daily.       No current facility-administered medications on file prior to visit.        Family History   Problem  Relation Age of Onset    Heart disease Father     Breast cancer Neg Hx     Ovarian cancer Neg Hx        Review of Systems   Constitutional: Negative for appetite change, chills, fever and unexpected weight change.   HENT: Negative for sore throat and trouble swallowing.    Eyes: Negative for pain and visual disturbance.   Respiratory: Negative for cough, shortness of breath and wheezing.    Cardiovascular: Negative for chest pain and palpitations.   Gastrointestinal: Negative for abdominal pain, blood in stool, diarrhea, nausea and vomiting.   Genitourinary: Negative for difficulty urinating, dysuria and hematuria.   Musculoskeletal: Positive for arthralgias (knees). Negative for gait problem and neck pain.   Skin: Negative for rash and wound.   Neurological: Negative for dizziness, weakness, numbness and headaches.   Hematological: Negative for adenopathy.   Psychiatric/Behavioral: Negative for dysphoric mood.       Objective:      /68 (BP Location: Left arm, Patient Position: Sitting)   Pulse 82   Temp 99.2 °F (37.3 °C) (Oral)   Wt 80.4 kg (177 lb 4 oz)   LMP 12/08/2014   SpO2 97%   BMI 27.76 kg/m²   Physical Exam   Constitutional: She is oriented to person, place, and time. She appears well-developed and well-nourished.   HENT:   Head: Normocephalic.   Mouth/Throat: Oropharynx is clear and moist. No oropharyngeal exudate or posterior oropharyngeal erythema.   Eyes: Pupils are equal, round, and reactive to light. Conjunctivae and EOM are normal.   Neck: Normal range of motion. Neck supple. No thyromegaly present.   Cardiovascular: Normal rate, regular rhythm, S1 normal, S2 normal, normal heart sounds and intact distal pulses. Exam reveals no gallop and no friction rub.   No murmur heard.  Pulmonary/Chest: Effort normal and breath sounds normal. She has no wheezes. She has no rales.   Abdominal: Normal appearance.   Musculoskeletal:        Right lower leg: She exhibits no edema.        Left lower  leg: She exhibits no edema.   Positive SLR on left  Decreased sensation over left lateral calf   Lymphadenopathy:     She has no cervical adenopathy.   Neurological: She is alert and oriented to person, place, and time. No cranial nerve deficit. Gait normal.   Reflex Scores:       Patellar reflexes are 2+ on the right side and 2+ on the left side.       Achilles reflexes are 2+ on the right side and 2+ on the left side.  + SLR on left   Skin: Skin is warm and intact. No rash noted.   Psychiatric: She has a normal mood and affect.         Results for orders placed or performed in visit on 07/23/19   Comprehensive metabolic panel   Result Value Ref Range    Sodium 135 (L) 136 - 145 mmol/L    Potassium 4.9 3.5 - 5.1 mmol/L    Chloride 101 95 - 110 mmol/L    CO2 26 23 - 29 mmol/L    Glucose 78 70 - 110 mg/dL    BUN, Bld 14 6 - 20 mg/dL    Creatinine 1.3 0.5 - 1.4 mg/dL    Calcium 9.7 8.7 - 10.5 mg/dL    Total Protein 7.6 6.0 - 8.4 g/dL    Albumin 4.3 3.5 - 5.2 g/dL    Total Bilirubin 0.2 0.1 - 1.0 mg/dL    Alkaline Phosphatase 49 (L) 55 - 135 U/L    AST 19 10 - 40 U/L    ALT 15 10 - 44 U/L    Anion Gap 8 8 - 16 mmol/L    eGFR if African American 58.9 (A) >60 mL/min/1.73 m^2    eGFR if non  51.1 (A) >60 mL/min/1.73 m^2   CBC auto differential   Result Value Ref Range    WBC 9.14 3.90 - 12.70 K/uL    RBC 4.32 4.00 - 5.40 M/uL    Hemoglobin 13.6 12.0 - 16.0 g/dL    Hematocrit 41.3 37.0 - 48.5 %    Mean Corpuscular Volume 96 82 - 98 fL    Mean Corpuscular Hemoglobin 31.5 (H) 27.0 - 31.0 pg    Mean Corpuscular Hemoglobin Conc 32.9 32.0 - 36.0 g/dL    RDW 11.9 11.5 - 14.5 %    Platelets 419 (H) 150 - 350 K/uL    MPV 10.6 9.2 - 12.9 fL    Immature Granulocytes 0.4 0.0 - 0.5 %    Gran # (ANC) 6.2 1.8 - 7.7 K/uL    Immature Grans (Abs) 0.04 0.00 - 0.04 K/uL    Lymph # 2.1 1.0 - 4.8 K/uL    Mono # 0.6 0.3 - 1.0 K/uL    Eos # 0.2 0.0 - 0.5 K/uL    Baso # 0.05 0.00 - 0.20 K/uL    nRBC 0 0 /100 WBC    Gran% 68.0 38.0  - 73.0 %    Lymph% 23.3 18.0 - 48.0 %    Mono% 6.0 4.0 - 15.0 %    Eosinophil% 1.8 0.0 - 8.0 %    Basophil% 0.5 0.0 - 1.9 %    Differential Method Automated        Assessment:       1. Lumbar radiculopathy    2. Herpes zoster with complication        Plan:       Lumbar radiculopathy  -     MRI Lumbar Spine Without Contrast; Future; Expected date: 02/04/2020  -     gabapentin (NEURONTIN) 300 MG capsule; Take 1 capsule (300 mg total) by mouth every evening.  Dispense: 30 capsule; Refill: 1  -     HYDROcodone-acetaminophen (NORCO) 5-325 mg per tablet; Take 1 tablet by mouth every 6 (six) hours as needed for Pain.  Dispense: 30 tablet; Refill: 0    Herpes zoster with complication          Suspect likely L5 left nerve impingment  Will try to manage pain and get MRI of lumbar spine  Consider pain management referral if source of nerve impingement amenable  Continue low-fat diet and regular exercise  Counseled on regular exercise, maintenance of a healthy weight, balanced diet rich in fruits/vegetables and lean protein, and avoidance of unhealthy habits like smoking and excessive alcohol intake.

## 2020-02-05 ENCOUNTER — HOSPITAL ENCOUNTER (OUTPATIENT)
Dept: RADIOLOGY | Facility: HOSPITAL | Age: 40
Discharge: HOME | End: 2020-02-05
Attending: FAMILY MEDICINE
Payer: COMMERCIAL

## 2020-02-05 DIAGNOSIS — M54.16 LUMBAR RADICULOPATHY: ICD-10-CM

## 2020-02-05 PROCEDURE — 72148 MRI LUMBAR SPINE W/O DYE: CPT | Mod: TC,PO

## 2020-02-05 PROCEDURE — 72148 MRI LUMBAR SPINE W/O DYE: CPT | Mod: 26,,, | Performed by: RADIOLOGY

## 2020-02-05 PROCEDURE — 72148 MRI LUMBAR SPINE WITHOUT CONTRAST: ICD-10-PCS | Mod: 26,,, | Performed by: RADIOLOGY

## 2020-02-07 ENCOUNTER — PATIENT MESSAGE (OUTPATIENT)
Dept: FAMILY MEDICINE | Facility: CLINIC | Age: 40
End: 2020-02-07

## 2020-02-10 ENCOUNTER — TELEPHONE (OUTPATIENT)
Dept: FAMILY MEDICINE | Facility: CLINIC | Age: 40
End: 2020-02-10

## 2020-02-10 DIAGNOSIS — M54.16 LUMBAR RADICULOPATHY: Primary | ICD-10-CM

## 2020-02-10 DIAGNOSIS — B02.8 HERPES ZOSTER WITH COMPLICATION: ICD-10-CM

## 2020-02-10 NOTE — TELEPHONE ENCOUNTER
Spoke with pt to discuss results and provider's recommendation. Pt verbalized understanding. Pt has already scheduled appt to see pain management.

## 2020-02-10 NOTE — TELEPHONE ENCOUNTER
MRI showed mild bulging to the left of her L4_l5 disc. This may account for some of her pain, but does not appear to need surgery. Since she is having so much pain and actually went to the ER for the pain over the weekend, I would like her to see pain management as I do not have much to offer her.  I have entered a referral.

## 2020-02-10 NOTE — TELEPHONE ENCOUNTER
----- Message from Brennan Priest sent at 2/10/2020  7:59 AM CST -----  Contact: Ptnt  Type: Needs Medical Advice & MRI results    Who Called:  Ptnt  [phone]      Symptoms (please be specific):  Pain on left side of her body from foot to under arm.    How long has patient had these symptoms:  Weeks ago.    MRI done on 02-05-20     Pharmacy name and phone #:      [id number] [place] [address]  [address]  [facility] [address]  Phone: [phone] Fax: [phone]    Best Call Back Number: [phone]    Additional Information: Advised needs to speak with the nurse about her MRI results. Please call with her results and recommendation.

## 2020-02-11 ENCOUNTER — OFFICE VISIT (OUTPATIENT)
Dept: FAMILY MEDICINE | Facility: CLINIC | Age: 40
End: 2020-02-11
Payer: COMMERCIAL

## 2020-02-11 VITALS
WEIGHT: 177.5 LBS | HEART RATE: 65 BPM | TEMPERATURE: 98 F | DIASTOLIC BLOOD PRESSURE: 78 MMHG | BODY MASS INDEX: 27.8 KG/M2 | OXYGEN SATURATION: 99 % | SYSTOLIC BLOOD PRESSURE: 112 MMHG

## 2020-02-11 DIAGNOSIS — M54.16 LUMBAR RADICULOPATHY: Primary | ICD-10-CM

## 2020-02-11 PROCEDURE — 99999 PR PBB SHADOW E&M-EST. PATIENT-LVL III: CPT | Mod: PBBFAC,,, | Performed by: FAMILY MEDICINE

## 2020-02-11 PROCEDURE — 99999 PR PBB SHADOW E&M-EST. PATIENT-LVL III: ICD-10-PCS | Mod: PBBFAC,,, | Performed by: FAMILY MEDICINE

## 2020-02-11 PROCEDURE — 99213 PR OFFICE/OUTPT VISIT, EST, LEVL III, 20-29 MIN: ICD-10-PCS | Mod: S$GLB,,, | Performed by: FAMILY MEDICINE

## 2020-02-11 PROCEDURE — 3008F BODY MASS INDEX DOCD: CPT | Mod: CPTII,S$GLB,, | Performed by: FAMILY MEDICINE

## 2020-02-11 PROCEDURE — 3008F PR BODY MASS INDEX (BMI) DOCUMENTED: ICD-10-PCS | Mod: CPTII,S$GLB,, | Performed by: FAMILY MEDICINE

## 2020-02-11 PROCEDURE — 99213 OFFICE O/P EST LOW 20 MIN: CPT | Mod: S$GLB,,, | Performed by: FAMILY MEDICINE

## 2020-02-11 RX ORDER — HYDROCODONE BITARTRATE AND ACETAMINOPHEN 5; 325 MG/1; MG/1
1 TABLET ORAL EVERY 6 HOURS PRN
Qty: 30 TABLET | Refills: 0 | Status: SHIPPED | OUTPATIENT
Start: 2020-02-11 | End: 2021-02-17

## 2020-02-11 NOTE — PROGRESS NOTES
"Subjective:       Patient ID: Haley Saravia is a 40 y.o. female.    Chief Complaint: Results (MRI )    Here to f/u on burning pain involving left foot.    C/o persistent burning pain down the left leg and radiating down into the first toe.  Left leg is sensitive to touch.  Pain is 8/10 at worse at night.  It improves in the morning.  She feels like the pain is persistent  She has had "sciatica" in the left leg for years.  Symptoms is worse with activity and with prolonged walking.  She went to the ER over the weekend for uncontrolled pain.  She has been taking hydrocodone 5/325 every 6 hours.  Also now taking gabapentin 300mg QHS for the last week    Hot water aggravates the pain in the flank and the leg.    She is taking valtrex 1000mg 2 daily.    Foot Pain   Associated symptoms include arthralgias (knees). Pertinent negatives include no abdominal pain, chest pain, chills, coughing, fever, headaches, nausea, neck pain, numbness, rash, sore throat, vomiting or weakness.       Past Medical History:   Diagnosis Date    Abnormal Pap smear          Acne     Dyspareunia     Encounter for long-term (current) use of other medications     GERD (gastroesophageal reflux disease)     Pelvic pain in female     LLQ    Post herpetic neuralgia        Past Surgical History:   Procedure Laterality Date    CYSTOSCOPY  1/11/2010    Dr Kendell Cart[facility] - [facility]    ESOPHAGOGASTRODUODENOSCOPY  6/05/2009    Dr Mark Caballero - [facility]    HYSTERECTOMY  1/2015    LEEP PROCEDURE      SALPINGOOPHORECTOMY  1/11/2010    [facility]    TONSILLECTOMY  07/10/2008    Dr DIANA Avila - [facility]       Review of patient's allergies indicates:   Allergen Reactions    Codeine Diarrhea    Medrol [methylprednisolone] Other (See Comments)     Uncontrollable coughing fits       Social History     Socioeconomic History    Marital status:      Spouse name: Not on file    Number of children: 1    Years of education: Not on " file    Highest education level: Not on file   Occupational History    Occupation: unemployed   Social Needs    Financial resource strain: Not on file    Food insecurity:     Worry: Not on file     Inability: Not on file    Transportation needs:     Medical: Not on file     Non-medical: Not on file   Tobacco Use    Smoking status: Former Smoker     Packs/day: 0.50     Years: 3.00     Pack years: 1.50    Smokeless tobacco: Never Used   Substance and Sexual Activity    Alcohol use: No    Drug use: No    Sexual activity: Yes     Partners: Male     Birth control/protection: OCP   Lifestyle    Physical activity:     Days per week: Not on file     Minutes per session: Not on file    Stress: Not on file   Relationships    Social connections:     Talks on phone: Not on file     Gets together: Not on file     Attends Sikhism service: Not on file     Active member of club or organization: Not on file     Attends meetings of clubs or organizations: Not on file     Relationship status: Not on file   Other Topics Concern    Not on file   Social History Narrative    Exercise: 3 days a week with        Current Outpatient Medications on File Prior to Visit   Medication Sig Dispense Refill    famotidine (PEPCID) 20 MG tablet Take 20 mg by mouth 2 (two) times daily.      gabapentin (NEURONTIN) 300 MG capsule Take 1 capsule (300 mg total) by mouth 3 (three) times daily. 90 capsule 0    multivitamin (THERAGRAN) per tablet Take 1 tablet by mouth once daily.      RABEprazole (ACIPHEX) 20 mg tablet Take 1 tablet (20 mg total) by mouth once daily. 30 tablet 11    spironolactone (ALDACTONE) 50 MG tablet       valACYclovir (VALTREX) 1000 MG tablet TAKE 1 TABLET (1,000 MG TOTAL) BY MOUTH ONCE DAILY. 90 tablet 3    vitamin E 400 UNIT capsule Take 400 Units by mouth once daily.       No current facility-administered medications on file prior to visit.        Family History   Problem Relation Age of Onset    Heart  disease Father     Breast cancer Neg Hx     Ovarian cancer Neg Hx        Review of Systems   Constitutional: Negative for appetite change, chills, fever and unexpected weight change.   HENT: Negative for sore throat and trouble swallowing.    Eyes: Negative for pain and visual disturbance.   Respiratory: Negative for cough, shortness of breath and wheezing.    Cardiovascular: Negative for chest pain and palpitations.   Gastrointestinal: Negative for abdominal pain, blood in stool, diarrhea, nausea and vomiting.   Genitourinary: Negative for difficulty urinating, dysuria and hematuria.   Musculoskeletal: Positive for arthralgias (knees). Negative for gait problem and neck pain.   Skin: Negative for rash and wound.   Neurological: Negative for dizziness, weakness, numbness and headaches.   Hematological: Negative for adenopathy.   Psychiatric/Behavioral: Negative for dysphoric mood.       Objective:      /78 (BP Location: Left arm, Patient Position: Sitting)   Pulse 65   Temp 97.9 °F (36.6 °C) (Oral)   Wt 80.5 kg (177 lb 7.5 oz)   LMP 12/08/2014   SpO2 99%   BMI 27.80 kg/m²   Physical Exam   Constitutional: She is oriented to person, place, and time. She appears well-developed and well-nourished.   HENT:   Head: Normocephalic.   Mouth/Throat: Oropharynx is clear and moist. No oropharyngeal exudate or posterior oropharyngeal erythema.   Eyes: Pupils are equal, round, and reactive to light. Conjunctivae and EOM are normal.   Neck: Normal range of motion. Neck supple. No thyromegaly present.   Cardiovascular: Normal rate, regular rhythm, S1 normal, S2 normal, normal heart sounds and intact distal pulses. Exam reveals no gallop and no friction rub.   No murmur heard.  Pulmonary/Chest: Effort normal and breath sounds normal. She has no wheezes. She has no rales.   Abdominal: Normal appearance.   Musculoskeletal:        Right lower leg: She exhibits no edema.        Left lower leg: She exhibits no edema.    Positive SLR on left  Decreased sensation over left lateral calf   Lymphadenopathy:     She has no cervical adenopathy.   Neurological: She is alert and oriented to person, place, and time. No cranial nerve deficit. Gait normal.   Reflex Scores:       Patellar reflexes are 2+ on the right side and 2+ on the left side.       Achilles reflexes are 2+ on the right side and 2+ on the left side.  + SLR on left   Skin: Skin is warm and intact. No rash noted.   Psychiatric: She has a normal mood and affect.         Results for orders placed or performed in visit on 07/23/19   Comprehensive metabolic panel   Result Value Ref Range    Sodium 135 (L) 136 - 145 mmol/L    Potassium 4.9 3.5 - 5.1 mmol/L    Chloride 101 95 - 110 mmol/L    CO2 26 23 - 29 mmol/L    Glucose 78 70 - 110 mg/dL    BUN, Bld 14 6 - 20 mg/dL    Creatinine 1.3 0.5 - 1.4 mg/dL    Calcium 9.7 8.7 - 10.5 mg/dL    Total Protein 7.6 6.0 - 8.4 g/dL    Albumin 4.3 3.5 - 5.2 g/dL    Total Bilirubin 0.2 0.1 - 1.0 mg/dL    Alkaline Phosphatase 49 (L) 55 - 135 U/L    AST 19 10 - 40 U/L    ALT 15 10 - 44 U/L    Anion Gap 8 8 - 16 mmol/L    eGFR if African American 58.9 (A) >60 mL/min/1.73 m^2    eGFR if non  51.1 (A) >60 mL/min/1.73 m^2   CBC auto differential   Result Value Ref Range    WBC 9.14 3.90 - 12.70 K/uL    RBC 4.32 4.00 - 5.40 M/uL    Hemoglobin 13.6 12.0 - 16.0 g/dL    Hematocrit 41.3 37.0 - 48.5 %    Mean Corpuscular Volume 96 82 - 98 fL    Mean Corpuscular Hemoglobin 31.5 (H) 27.0 - 31.0 pg    Mean Corpuscular Hemoglobin Conc 32.9 32.0 - 36.0 g/dL    RDW 11.9 11.5 - 14.5 %    Platelets 419 (H) 150 - 350 K/uL    MPV 10.6 9.2 - 12.9 fL    Immature Granulocytes 0.4 0.0 - 0.5 %    Gran # (ANC) 6.2 1.8 - 7.7 K/uL    Immature Grans (Abs) 0.04 0.00 - 0.04 K/uL    Lymph # 2.1 1.0 - 4.8 K/uL    Mono # 0.6 0.3 - 1.0 K/uL    Eos # 0.2 0.0 - 0.5 K/uL    Baso # 0.05 0.00 - 0.20 K/uL    nRBC 0 0 /100 WBC    Gran% 68.0 38.0 - 73.0 %    Lymph% 23.3  18.0 - 48.0 %    Mono% 6.0 4.0 - 15.0 %    Eosinophil% 1.8 0.0 - 8.0 %    Basophil% 0.5 0.0 - 1.9 %    Differential Method Automated      MRI reviewed    Assessment:       1. Lumbar radiculopathy        Plan:       Lumbar radiculopathy  -     HYDROcodone-acetaminophen (NORCO) 5-325 mg per tablet; Take 1 tablet by mouth every 6 (six) hours as needed for Pain.  Dispense: 30 tablet; Refill: 0          Suspect likely L5 left sensory nerve impingement  F/u with pain management as planned; may benefit from BRIANNE and then possible PT after pain controlled  Continue hydrocodone QID  Increase gabapentin to BID  Counseled on regular exercise, maintenance of a healthy weight, balanced diet rich in fruits/vegetables and lean protein, and avoidance of unhealthy habits like smoking and excessive alcohol intake.

## 2020-02-13 ENCOUNTER — PATIENT OUTREACH (OUTPATIENT)
Dept: ADMINISTRATIVE | Facility: OTHER | Age: 40
End: 2020-02-13

## 2020-02-14 ENCOUNTER — OFFICE VISIT (OUTPATIENT)
Dept: PAIN MEDICINE | Facility: CLINIC | Age: 40
End: 2020-02-14
Payer: COMMERCIAL

## 2020-02-14 VITALS
DIASTOLIC BLOOD PRESSURE: 58 MMHG | RESPIRATION RATE: 20 BRPM | BODY MASS INDEX: 28.16 KG/M2 | WEIGHT: 179.38 LBS | HEIGHT: 67 IN | SYSTOLIC BLOOD PRESSURE: 117 MMHG | TEMPERATURE: 97 F | OXYGEN SATURATION: 100 % | HEART RATE: 76 BPM

## 2020-02-14 DIAGNOSIS — B02.8 HERPES ZOSTER WITH COMPLICATION: ICD-10-CM

## 2020-02-14 DIAGNOSIS — G89.29 CHRONIC LEFT-SIDED LOW BACK PAIN WITH LEFT-SIDED SCIATICA: ICD-10-CM

## 2020-02-14 DIAGNOSIS — M54.42 CHRONIC LEFT-SIDED LOW BACK PAIN WITH LEFT-SIDED SCIATICA: ICD-10-CM

## 2020-02-14 DIAGNOSIS — M54.16 LUMBAR RADICULOPATHY: Primary | ICD-10-CM

## 2020-02-14 PROCEDURE — 99999 PR PBB SHADOW E&M-EST. PATIENT-LVL IV: CPT | Mod: PBBFAC,,, | Performed by: ANESTHESIOLOGY

## 2020-02-14 PROCEDURE — 99244 OFF/OP CNSLTJ NEW/EST MOD 40: CPT | Mod: S$GLB,,, | Performed by: ANESTHESIOLOGY

## 2020-02-14 PROCEDURE — 99999 PR PBB SHADOW E&M-EST. PATIENT-LVL IV: ICD-10-PCS | Mod: PBBFAC,,, | Performed by: ANESTHESIOLOGY

## 2020-02-14 PROCEDURE — 99244 PR OFFICE CONSULTATION,LEVEL IV: ICD-10-PCS | Mod: S$GLB,,, | Performed by: ANESTHESIOLOGY

## 2020-02-14 NOTE — PROGRESS NOTES
[facility] New Patient Evaluation    Referred by: Dr. Kiran  Reason for referral: back pain    CC:   Chief Complaint   Patient presents with    Establish Care    Low-back Pain    Herpes Zoster      Last 3 PDI Scores 2/14/2020 9/25/2014 9/4/2014   Pain Disability Index (PDI) 0 0 0       HPI:   Haley Saravia is a 40 y.o. female who complains of back pain    Onset: 6 weeks  Progression: since onset, pain is gradually worsening  Current Pain Score: 6/10  Timing: constant  Quality: burning, shooting  Radiation: yes, down outside of the left foot  Associated numbness or weakness: yes numbness, no weakness  Exacerbated by: standing, walking  Allievated by: rest  Is Pain Level Acceptable?: No    Previous Therapies:  PT/OT:   HEP: yes  Interventions:   Surgery:  Medications:   - NSAIDS: ibuprofen  - MSK Relaxants:   - TCAs:   - SNRIs:   - Topicals:   - Anticonvulsants: gabapentin  - Opioids: hydrocodone    History:    Current Outpatient Medications:     famotidine (PEPCID) 20 MG tablet, Take 20 mg by mouth 2 (two) times daily., Disp: , Rfl:     gabapentin (NEURONTIN) 300 MG capsule, Take 1 capsule (300 mg total) by mouth 3 (three) times daily., Disp: 90 capsule, Rfl: 0    HYDROcodone-acetaminophen (NORCO) 5-325 mg per tablet, Take 1 tablet by mouth every 6 (six) hours as needed for Pain., Disp: 30 tablet, Rfl: 0    multivitamin (THERAGRAN) per tablet, Take 1 tablet by mouth once daily., Disp: , Rfl:     RABEprazole (ACIPHEX) 20 mg tablet, Take 1 tablet (20 mg total) by mouth once daily., Disp: 30 tablet, Rfl: 11    spironolactone (ALDACTONE) 50 MG tablet, , Disp: , Rfl:     valACYclovir (VALTREX) 1000 MG tablet, TAKE 1 TABLET (1,000 MG TOTAL) BY MOUTH ONCE DAILY., Disp: 90 tablet, Rfl: 3    vitamin E 400 UNIT capsule, Take 400 Units by mouth once daily., Disp: , Rfl:     Past Medical History:   Diagnosis Date    Abnormal Pap smear          Acne     Dyspareunia     Encounter for long-term  (current) use of other medications     GERD (gastroesophageal reflux disease)     Pelvic pain in female     LLQ    Post herpetic neuralgia        Past Surgical History:   Procedure Laterality Date    CYSTOSCOPY  1/11/2010    Dr [facility]ell [facility]    ESOPHAGOGASTRODUODENOSCOPY  6/05/2009    Dr Mark Caball[facility] - [facility]    HYSTERECTOMY  1/2015    LEEP PROCEDURE      SALPINGOOPHORECTOMY  1/11/2010    Left - Dr Kendell [facility]    TONSILLECTOMY  07/10/2008    Dr DIANA Avila - [facility]       Family History   Problem Relation Age of Onset    Heart disease Father     Breast cancer Neg Hx     Ovarian cancer Neg Hx        Social History     Socioeconomic History    Marital status:      Spouse name: Not on file    Number of children: 1    Years of education: Not on file    Highest education level: Not on file   Occupational History    Occupation: unemployed   Social Needs    Financial resource strain: Not on file    Food insecurity:     Worry: Not on file     Inability: Not on file    Transportation needs:     Medical: Not on file     Non-medical: Not on file   Tobacco Use    Smoking status: Former Smoker     Packs/day: 0.50     Years: 3.00     Pack years: 1.50    Smokeless tobacco: Never Used   Substance and Sexual Activity    Alcohol use: No    Drug use: No    Sexual activity: Yes     Partners: Male     Birth control/protection: OCP   Lifestyle    Physical activity:     Days per week: Not on file     Minutes per session: Not on file    Stress: Not on file   Relationships    Social connections:     Talks on phone: Not on file     Gets together: Not on file     Attends Mandaeism service: Not on file     Active member of club or organization: Not on file     Attends meetings of clubs or organizations: Not on file     Relationship status: Not on file   Other Topics Concern    Not on file   Social History Narrative    Exercise: 3 days a week with        Review of patient's allergies  "indicates:   Allergen Reactions    Codeine Diarrhea    Medrol [methylprednisolone] Other (See Comments)     Uncontrollable coughing fits       Review of Systems:  General ROS: negative for - fever  Psychological ROS: negative for - hostility  Hematological and Lymphatic ROS: negative for - bleeding problems  Endocrine ROS: negative for - unexpected weight changes  Respiratory ROS: no cough, shortness of breath, or wheezing  Cardiovascular ROS: no chest pain or dyspnea on exertion  Gastrointestinal ROS: no abdominal pain, change in bowel habits, or black or bloody stools  Musculoskeletal ROS: negative for - muscular weakness  Neurological ROS: negative for - numbness/tingling  Dermatological ROS: positive for rash    Physical Exam:  Vitals:    02/14/20 1306   BP: (!) 117/58   Pulse: 76   Resp: 20   Temp: 97 °F (36.1 °C)   TempSrc: Oral   SpO2: 100%   Weight: 81.4 kg (179 lb 5.5 oz)   Height: 5' 7" (1.702 m)   PainSc:   6   PainLoc: Back     Body mass index is 28.09 kg/m².     Gen: NAD  Gait: gait intact  Psych:  Mood appropriate for given condition  HEENT: eyes anicteric   GI: Abd soft  CV: RRR  Lungs: breathing unlabored   ROM: limited AROM of the L spine in all planes, full ROM at ankles, knees and hips  Lumbar flexion 90 degrees, extension 50 degrees, side bending 30 degrees.    Sensation: intact to light touch in all dermatomes tested from L2-S1 bilaterally  Reflexes: 2+ b/l patella and 0/0 b/l achilles  Palpation: Diffusely tender over lumbar paraspinals  -TTP over the b/l greater trochanters and bilateral SI joint  Tone: normal in the b/l knees and hips   Skin: intact  Extremities: No edema in b/l ankles or hands       Right Left   L2/3 Iliacus Hip flexion  5  5   L3/4 Qudratus Femoris Knee Extension  5  5   L4/5 Tib Anterior Ankle Dorsiflexion   5  5   L5/S1 Extensor Hallicus Longus Great toe extension  5  5   L4/5 Tib Anterior/Posterior Inversion  5  5   L5/S1 Extensor Digitorum Longus, Peronues Eversion  5 "  5   S1/S2 Gastroc/Soleus Plantar Flexion  5  5       Imaging:  MRI lumbar spine 2/5/20  FINDINGS:  Vertebral column: The lumbar vertebral bodies maintain normal height and alignment.  There is no fracture.  There is very mild disc space narrowing at the L4-5 level.  The lower 2 lumbar discs are mildly desiccated.  Baseline marrow signal intensity is normal.    Spinal canal, conus, epidural space: The spinal canal is developmentally normal.  The conus terminates at the level of T12-L1 and is normal in contour and signal intensity.  There is no abnormal epidural soft tissue mass or fluid collection.    Findings by level:    On the sagittal images, the T10-11 and T11-12 levels are normal.    T12-L1: Unremarkable.  L1-2: There is very mild left facet joint arthropathy.  There is no spinal canal or significant foraminal stenosis.  L2-3: There is a minimal disc bulge eccentric to the left.  There is no spinal canal or significant foraminal stenosis.  L3-4: There is mild facet joint arthropathy.  There is no spinal canal or significant foraminal stenosis.  L4-5: There is a mild disc bulge with osteophytic ridging in addition to mild facet joint arthropathy.  There is no spinal stenosis.  There is mild bilateral foraminal stenosis as well as mild crowding of the left lateral recess.  L5-S1: There is a mild disc bulge with dorsal annular fissure in addition to mild facet joint arthropathy.  There is no spinal stenosis.  There is mild bilateral foraminal stenosis.  Soft tissues, other: The prevertebral soft tissues are normal.  The aorta is normal in caliber.    Labs:  BMP  Lab Results   Component Value Date     (L) 07/23/2019    K 4.9 07/23/2019     07/23/2019    CO2 26 07/23/2019    BUN 14 07/23/2019    CREATININE 1.3 07/23/2019    CALCIUM 9.7 07/23/2019    ANIONGAP 8 07/23/2019    ESTGFRAFRICA 58.9 (A) 07/23/2019    EGFRNONAA 51.1 (A) 07/23/2019     Lab Results   Component Value Date    ALT 15 07/23/2019     AST 19 07/23/2019    ALKPHOS 49 (L) 07/23/2019    BILITOT 0.2 07/23/2019       Assessment:   Problem List Items Addressed This Visit        Neuro    Lumbar radiculopathy - Primary    Relevant Orders    Ambulatory referral/consult to Physical/Occupational Therapy       ID    Herpes zoster with complication       Orthopedic    Chronic left-sided low back pain with left-sided sciatica        40 y.o. year old female with PMH GERD, PHN presents to the office back.  Today she reports pain in the outside of her left lower leg and outside of her left foot, constant, burning, shooting.  She has numbness but no weakness.  She continues gabapentin and hydrocodone prn.  She has an acute zoster rash on the left flank but denies any rash down her leg or on her foot.  On exam she has 5/5 strength b/l LE's, 2+ b/l patella and 0/0 b/l achilles, intact to light touch in all dermatomes tested from L2-S1 bilaterally. MRI lumbar spine with L4-5 mild bilateral foraminal stenosis as well as mild crowding of the left lateral recess and L5-S1 mild disc bulge with dorsal annular fissure and mild bilateral foraminal stenosis. I discussed BRIANNE vs conservative care.  We discussed that there is a change BRIANNE may cause recurrence of zoster outbreak.  She would like to avoid this if possible.   I've placed a referral for her to start formal PT.  She will follow up in 6 weeks and if her pain hasn't improved we will again consider BRIANNE.     Treatment Plan:   Procedures: as above  PT/OT/HEP: Referral given for physical therapy to improve function and strength, and to receive training towards establishing a safe and effective home exercise program (HEP).   Medications: continue gabapentin as prescribed.  She will continue hydrocodone 5/325mg prn as prescribed  Labs: Reviewed and medications are appropriately dosed for current hepatorenal function.  Imaging: No additional recommended at this time.    : Reviewed and consistent with medication use as  fawad.    Kosta Henning M.D.  Interventional Pain Medicine / Anesthesiology

## 2020-02-17 ENCOUNTER — CLINICAL SUPPORT (OUTPATIENT)
Dept: REHABILITATION | Facility: HOSPITAL | Age: 40
End: 2020-02-17
Attending: ANESTHESIOLOGY
Payer: COMMERCIAL

## 2020-02-17 DIAGNOSIS — M54.16 LUMBAR RADICULOPATHY: ICD-10-CM

## 2020-02-17 PROCEDURE — 97161 PT EVAL LOW COMPLEX 20 MIN: CPT | Mod: PO | Performed by: PHYSICAL THERAPIST

## 2020-02-17 NOTE — PLAN OF CARE
[facility]  Physical Therapy Initial Evaluation    Name: Haley Saravia  Clinic Number: [id number]    Therapy Diagnosis:   Encounter Diagnosis   Name Primary?    Lumbar radiculopathy      Physician: Kosta Henning MD    Physician Orders: PT Eval and Treat   Medical Diagnosis from Referral:   Lumbar radiculopathy     Evaluation Date: 2/17/2020  Authorization Period Expiration: 09/01/2020  Plan of Care Expiration: 4/3/2020  Visit # / Visits authorized: 1/ 20    Time In: 1200  Time Out: 1300  Total Billable Time: 60 minutes    Precautions: Standard    Subjective   Date of onset: 6 weeks, 2/11/2020 as well  History of current condition - Haley reports: ongoing left leg/foot burning like sensation for about six weeks. The pain is intermittent, localized to the left leg (from left lateral foot to the knee and some above the knee). This was insidious with no mechanism of injury. Currently reporting no low back pain. No groin pain. No buttock pain. No falls noted. No change in bowel/bladder.    Past Medical History:   Diagnosis Date    Abnormal Pap smear          Acne     Dyspareunia     Encounter for long-term (current) use of other medications     GERD (gastroesophageal reflux disease)     Pelvic pain in female     LLQ    Post herpetic neuralgia      Haley Saravia  has a past surgical history that includes LEEP PROCEDURE; Tonsillectomy (07/10/2008); Salpingoophorectomy (1/11/2010); CYSTOSCOPY (1/11/2010); Esophagogastroduodenoscopy (6/05/2009); and Hysterectomy (1/2015).    Haley has a current medication list which includes the following prescription(s): famotidine, gabapentin, hydrocodone-acetaminophen, multivitamin, rabeprazole, spironolactone, valacyclovir, and vitamin e.    Review of patient's allergies indicates:   Allergen Reactions    Codeine Diarrhea    Medrol [methylprednisolone] Other (See Comments)     Uncontrollable coughing fits      Imaging:  MRI  Impression  1. There is degenerative change discussed above.  There is no fracture or malalignment.  There is no spinal stenosis at any level.  2. There is mild bilateral foraminal stenosis with mild crowding of the left lateral recess at the L4-5 level.  3. There is mild bilateral foraminal stenosis at the L5-S1 level.    Prior Therapy: First time  Social History:  lives with their spouse  Occupation: Retired  Prior Level of Function: independent  Current Level of Function: modified independent, increase time with home management.  Recent or major surgery: none  Accidents: none    Pain:  Current 6/10, worst 8/10, best 0/10   Location: left lower leg/ foot (lateral side)  Description: Burning, Tingling, Shooting and Variable  Aggravating Factors:Nothing specific at this time  Easing Factors: nothing noted     Pts goals: Decrease pain to the left foot    Objective   Posture: cervical protraction, round shoulders  Palpation: no points of tenderness noted  Sensation: Dermatomes     Right Left Comment   L2 (lateral thigh) intact intact    L3 (medial thigh) intact intact    L4 (medial calf) intact intact    L5 (lateral calf) intact intact    S1 (lateral foot) intact intact    S2 (gastro/HS) intact intact    Saddle (cauda equina) intact intact      Myotomes:   Right Left Comment   Hip flexion (L2-3): 5/5 5/5    Knee extension (L3-4): 5/5 5/5    DF (L4-5): 5/5 5/5    Great Toe Ext (L5-S1):   5/5 5/5    Glut Medius (L5) 4/5 4/5    Great Toe Flex/HS (S1-S2) 5/5 5/5      DTR:   Right Left Comment   Patellar (L3-4) 2+ 2+    Achilles (S1) 2+ 2+        Thoracic/Lumbar AROM:     % limitation Pain/Dysfunction/movement   Flexion  (55-60 N)    25% Cannot touch toes  Non-uniform curvature     Extension (25 N)  50% UE does not maintain 170  ASIS does not clear toes  Lack of posterior weight shift     Special Tests:  -Repeated Flexion: produce tingling in the left foot, no worse afterwards  -Repeated Ext: low back twinge noted,  no worse  Unloading: extension in lying: NE for the first 10    Lumbar:   SLR (with leg dominant pain)-  Slump-  Clonus-  Babinski-    UMN testing:  Cross over sign: -    Palpation for condition/position, PIVM:   Increase sensitivity to left lateral foot, left lower leg(peroneal area as well)    GAIT: Haley ambulates 50 feet with no assistive device with independently.     Transfers: independent    Pt/family was provided educational information, including: role of PT, goals for PT, scheduling - pt verbalized understanding. Discussed insurance limitations with pt.       CMS Impairment/Limitation/Restriction for FOTO Lumbar Spine Survey  Status Limitation G-Code CMS Severity Modifier  Intake 48% 52% Current Status CK - At least 40 percent but less than 60 percent  Predicted 68% 32% Goal Status+ CJ - At least 20 percent but less than 40 percent       TREATMENT   Treatment Time In: 1250  Treatment Time Out: 1255  Total Treatment time separate from Evaluation: 5 minutes    Haley received therapeutic exercises to develop strength, endurance, ROM, flexibility, posture and core stabilization for 5 minutes including:  Extension in lying  Seated: ergonomics  Home Exercises and Patient Education Provided    Education provided:   - Yes    Written Home Exercises Provided: yes.  Exercises were reviewed and Haley was able to demonstrate them prior to the end of the session.  Haley demonstrated good  understanding of the education provided.     See EMR under Patient Instructions for exercises provided 2/17/2020.    Assessment   Haley is a 40 y.o. female referred to outpatient Physical Therapy with a medical diagnosis of Lumbar radiculopathy. Pt presents with decreased lumbar mobility, left lower extremity pain.    Problem List: pain, decreased ROM, decreased flexibility, decreased strength, antalgic gait and inability to participate fully in vocation pursuits.    Pt prognosis is Good.   Pt will benefit from skilled  outpatient Physical Therapy to address the deficits stated above and in the chart below, provide pt/family education, and to maximize pt's level of independence.     Plan of care discussed with patient: Yes  Pt's spiritual, cultural and educational needs considered and patient is agreeable to the plan of care and goals as stated below:     Anticipated Barriers for therapy: None    Medical Necessity is demonstrated by the following  History  Co-morbidities and personal factors that may impact the plan of care Co-morbidities:   none    Personal Factors:   no deficits     low   Examination  Body Structures and Functions, activity limitations and participation restrictions that may impact the plan of care Body Regions:   back  lower extremities    Body Systems:    ROM  strength  balance  gait  transfers  transitions    Participation Restrictions:   Home management  Community ambulation    Activity limitations:   Learning and applying knowledge  no deficits    General Tasks and Commands  no deficits    Communication  no deficits    Mobility  lifting and carrying objects  walking    Self care  no deficits    Domestic Life  doing house work (cleaning house, washing dishes, laundry)    Interactions/Relationships  no deficits    Life Areas  no deficits    Community and Social Life  no deficits         high   Clinical Presentation stable and uncomplicated low   Decision Making/ Complexity Score: low     Short Term GOALS:  In 4 weeks, pt. will:  - Establish lumbar preference of motion for decreased pain by 25% for home management.  - decrease outcome measure limitation to <50%  - ambulate > 300 ft with no left leg pain for home management purposes.    Long Term GOALS:  In 6 weeks, pt. will:  - be independent and compliant with HEP and SX management   - decrease outcome measure limitation to <40%  - report > 50% reduction in LLE pain/numbness for ADL purposes.    Plan   Plan of care Certification: 2/17/2020 to  04/03/2020.  Outpatient Physical Therapy 2 times weekly for 6 weeks to include the following interventions: Cervical/Lumbar Traction, Electrical Stimulation IFC, Manual Therapy, Moist Heat/ Ice, Neuromuscular Re-ed, Patient Education, Therapeutic Activites and Therapeutic Exercise.      Haley may at times be seen by a PTA as part of the Rehab Team.    Dar Araujo, PT

## 2020-02-20 ENCOUNTER — CLINICAL SUPPORT (OUTPATIENT)
Dept: REHABILITATION | Facility: HOSPITAL | Age: 40
End: 2020-02-20
Attending: ANESTHESIOLOGY
Payer: COMMERCIAL

## 2020-02-20 DIAGNOSIS — M54.16 LUMBAR RADICULOPATHY: Primary | ICD-10-CM

## 2020-02-20 PROCEDURE — 97110 THERAPEUTIC EXERCISES: CPT | Mod: PO,CQ

## 2020-02-20 NOTE — PROGRESS NOTES
Physical Therapy Daily Treatment Note     Name: Haley Saravia  Clinic Number: [id number]    Therapy Diagnosis:   Encounter Diagnosis   Name Primary?    Lumbar radiculopathy Yes     Physician: Kosta Henning MD    Visit Date: 2/20/2020  Physician Orders: PT Eval and Treat   Medical Diagnosis from Referral:   Lumbar radiculopathy      Evaluation Date: 2/17/2020  Authorization Period Expiration: 09/01/2020  Plan of Care Expiration: 4/3/2020  Visit # / Visits authorized: 2/ 20    Time In: 1458  Time Out: 1545  Total Billable Time: 45 minutes    Precautions: Standard    Subjective     Pt reports: she now has numbness and tingling in her toes rather than just her foot. Patient states she been doing extension based exercises and she hasn't noticed an increase in symptoms while performing these exercises. She was compliant with home exercise program.  Response to previous treatment: numbness/tinglint in L foot  Functional change: too soon to determine    Pain: 2/10  Location: left lateral foot/toes      Objective     Haley received therapeutic exercises to develop strength, endurance, ROM, flexibility, posture and core stabilization for 45 minutes including:  Upright bike x 8 minutes  Prone on elbows x 3 minutes   Prone press up 2x10, 3 sec hold   Prone hip extension 2x10    SKTC 10x 5 sec hold   DKTC with green SB 10x 5 sec hold  Supine hip flexor stretch 30 sec x 3 (B)    Supine sciatic nerve glide 3x10 (PTA assist to maintain pain free ROM)   Supine piriformis stretch 30 sec x 3 (B)     Posterior pelvic tilt x 2 minutes, 3 sec hold  PPT + ball squeeze x 2 minutes  PPT + march x 2 minutes  Supine bridging 2x10  S/L clamshell (green TB) 2x10     Home Exercises Provided and Patient Education Provided     Education provided:   - HEP compliance    Written Home Exercises Provided: yes with green theraband.  Exercises were reviewed and Haley was able to demonstrate them prior to the end of the session.  Haley  demonstrated good  understanding of the education provided.     See EMR under Patient Instructions for exercises provided 2/20/2020.    Assessment     No immediate change is distal LLE radicular symptoms with lumbar flexion or extension biased exercises this date. Patient demonstrates decreased anterior and posterior muscle flexibility and sciatic  neural tension through LLE compared to RLE. Patient able to perform all core and hip stabilization exercises without complaints of increased LLE radicular symptoms but gluteal training effect easily achieved.   Haley is progressing well towards her goals.   Pt prognosis is Good.     Pt will continue to benefit from skilled outpatient physical therapy to address the deficits listed in the problem list box on initial evaluation, provide pt/family education and to maximize pt's level of independence in the home and community environment.     Pt's spiritual, cultural and educational needs considered and pt agreeable to plan of care and goals.    Anticipated barriers to physical therapy: none     Goals:   Short Term GOALS:  In 4 weeks, pt. will:  - establish lumbar preference of motion for decreased pain by 25% for home management. (progressing, not met)  - decrease outcome measure limitation to <50% (progressing, not met)  - ambulate > 300 ft with no left leg pain for home management purposes. (progressing, not met)     Long Term GOALS:  In 6 weeks, pt. will:  - be independent and compliant with HEP and SX management (progressing, not met)  - decrease outcome measure limitation to <40% (progressing, not met)  - report > 50% reduction in LLE pain/numbness for ADL purposes. (progressing, not met)     Plan     Continue current POC with emphasis on reduction/resolution of LLE radicular symptoms.     Elana Rubio, PTA

## 2020-02-27 ENCOUNTER — CLINICAL SUPPORT (OUTPATIENT)
Dept: REHABILITATION | Facility: HOSPITAL | Age: 40
End: 2020-02-27
Attending: ANESTHESIOLOGY
Payer: COMMERCIAL

## 2020-02-27 DIAGNOSIS — M53.86 DECREASED RANGE OF MOTION OF INTERVERTEBRAL DISCS OF LUMBAR SPINE: ICD-10-CM

## 2020-02-27 PROCEDURE — 97110 THERAPEUTIC EXERCISES: CPT | Mod: PO | Performed by: PHYSICAL THERAPIST

## 2020-02-27 NOTE — PROGRESS NOTES
"  Physical Therapy Daily Treatment Note     Name: Haley Saravia  Clinic Number: [id number]    Therapy Diagnosis:   Encounter Diagnosis   Name Primary?    Decreased range of motion of intervertebral discs of lumbar spine      Physician: Kosta Henning MD    Visit Date: 2020  Physician Orders: PT Eval and Treat   Medical Diagnosis from Referral:   Lumbar radiculopathy      Evaluation Date: 2020  Authorization Period Expiration: 2020  Plan of Care Expiration: 4/3/2020  Visit # / Visits authorized:     Time In: 1350  Time Out: 1440  Total Billable Time: 30 minutes    Precautions: Standard    Subjective     Pt reports: less intensity to left lower extremity. No adverse effects. She was compliant with home exercise program.  Response to previous treatment: numbness/tinglint in L foot  Functional change: too soon to determine    Pain: 2/10  Location: left lateral foot/toes      Objective     Haley received therapeutic exercises to develop strength, endurance, ROM, flexibility, posture and core stabilization for 45 minutes including: (30 minutes one on one)    Upright bike x 8 minutes  Standing: gastroc stretch 3/30"  Prone on elbows x 3 minutes   Prone press up 2x10, 3 sec hold   Prone hip extension 2x10  SKTC 10x 5 sec hold (not performed)  DKTC with green SB 10x 5 sec hold  Supine hip flexor stretch 30 sec x 3 (B)    Supine sciatic nerve glide 3x10 (PTA assist to maintain pain free ROM) (previous)  Supine piriformis stretch 30 sec x 3 (B)     Posterior pelvic tilt x 2 minutes, 3 sec hold, with green band  PPT + ball squeeze x 2 minutes  PPT + march x 2 minutes, progressed to dead-bug  X 20 reps  Supine bridging 2 x10 with green band  S/L clamshell (green TB) 2x10     Extension in lyn/10    Home Exercises Provided and Patient Education Provided     Education provided:   - HEP compliance    Written Home Exercises Provided: yes with green theraband.  Exercises were reviewed and Haley was " able to demonstrate them prior to the end of the session.  Haley demonstrated good  understanding of the education provided.     See EMR under Patient Instructions for exercises provided 2/20/2020.    Assessment   Patient demonstrated good tolerance with supine TE progression . Cueing on TA activation. Extension in lying noted with no low back pain along with no leg pain. No adverse effects.  Haley is progressing well towards her goals.   Pt prognosis is Good.     Pt will continue to benefit from skilled outpatient physical therapy to address the deficits listed in the problem list box on initial evaluation, provide pt/family education and to maximize pt's level of independence in the home and community environment.     Pt's spiritual, cultural and educational needs considered and pt agreeable to plan of care and goals.    Anticipated barriers to physical therapy: none     Goals:   Short Term GOALS:  In 4 weeks, pt. will:  - establish lumbar preference of motion for decreased pain by 25% for home management. (progressing, not met)  - decrease outcome measure limitation to <50% (progressing, not met)  - ambulate > 300 ft with no left leg pain for home management purposes. (progressing, not met)     Long Term GOALS:  In 6 weeks, pt. will:  - be independent and compliant with HEP and SX management (progressing, not met)  - decrease outcome measure limitation to <40% (progressing, not met)  - report > 50% reduction in LLE pain/numbness for ADL purposes. (progressing, not met)     Plan     Continue current POC with emphasis on reduction/resolution of LLE radicular symptoms.     Dar Araujo, PT

## 2020-03-17 ENCOUNTER — PATIENT OUTREACH (OUTPATIENT)
Dept: ADMINISTRATIVE | Facility: OTHER | Age: 40
End: 2020-03-17

## 2020-03-24 PROBLEM — M53.86 DECREASED RANGE OF MOTION OF INTERVERTEBRAL DISCS OF LUMBAR SPINE: Status: RESOLVED | Noted: 2020-02-27 | Resolved: 2020-03-24

## 2020-04-08 ENCOUNTER — PATIENT OUTREACH (OUTPATIENT)
Dept: ADMINISTRATIVE | Facility: OTHER | Age: 40
End: 2020-04-08

## 2020-06-23 DIAGNOSIS — R11.0 NAUSEA: ICD-10-CM

## 2020-06-23 DIAGNOSIS — R14.2 BELCHING: ICD-10-CM

## 2020-06-23 DIAGNOSIS — R63.0 ANOREXIA: ICD-10-CM

## 2020-06-23 DIAGNOSIS — K21.9 GASTROESOPHAGEAL REFLUX DISEASE, ESOPHAGITIS PRESENCE NOT SPECIFIED: ICD-10-CM

## 2020-06-23 DIAGNOSIS — R10.13 EPIGASTRIC PAIN: ICD-10-CM

## 2020-06-23 NOTE — PROGRESS NOTES
Refill Routing Note    Medication(s) are not appropriate for processing by [facility]:       LAST COMMENTED THERAPY COMPLETED        Medication Therapy Plan: THERAPY COMPLETED(07/18/15-NANCI); LAST PRESCRIBED BY SRI GRIJALVA NP(7/2019), DEFER TO YOU  Medication reconciliation completed: No      Automatic Epic Protocol Generated Data:    Requested Prescriptions   Pending Prescriptions Disp Refills    RABEprazole (ACIPHEX) 20 mg tablet [Pharmacy Med Name: RABEPRAZOLE SOD DR 20 MG TAB] 90 tablet 0     Sig: TAKE 1 TABLET BY MOUTH EVERY DAY       Gastroenterology: Proton Pump Inhibitors 2 Passed - 6/23/2020  7:47 AM        Passed - Patient is at least 18 years old        Passed - Osteoporosis is not on problem list        Passed - Negative Pregnancy Status Check        Passed - An appropriate indication is on the problem list        Passed - Office visit in past 6 months or future 90 days.     Recent Outpatient Visits            4 months ago Lumbar radiculopathy    [place] [facility] Kosta Henning MD    4 months ago Lumbar radiculopathy    [facility] Shawn Kiran MD    4 months ago Lumbar radiculopathy    [facility] Shawn Kiran MD    11 months ago Epigastric pain    [facility] Sri Grijalva, NP    1 year ago Extensor tendinitis of foot - Left Foot    [facility] Patria Modi, DPM          Future Appointments              In 1 month Debbi Romano DO [facility], [place]                      Appointments  past 12m or future 3m with PCP    Date Provider   Last Visit   2/11/2020 Shawn Kiran MD   Next Visit   Visit date not found Shawn Kiran MD   ED visits in past 90 days: 0     Note composed:1:14 PM 06/23/2020

## 2020-06-25 DIAGNOSIS — K21.9 GASTROESOPHAGEAL REFLUX DISEASE, ESOPHAGITIS PRESENCE NOT SPECIFIED: ICD-10-CM

## 2020-06-25 DIAGNOSIS — R10.13 EPIGASTRIC PAIN: ICD-10-CM

## 2020-06-25 DIAGNOSIS — R63.0 ANOREXIA: ICD-10-CM

## 2020-06-25 DIAGNOSIS — R14.2 BELCHING: ICD-10-CM

## 2020-06-25 DIAGNOSIS — R11.0 NAUSEA: ICD-10-CM

## 2020-06-25 RX ORDER — RABEPRAZOLE SODIUM 20 MG/1
20 TABLET, DELAYED RELEASE ORAL DAILY
Qty: 30 TABLET | Refills: 11 | OUTPATIENT
Start: 2020-06-25 | End: 2021-06-25

## 2020-06-25 NOTE — PROGRESS NOTES
Refill Routing Note   Medication(s) are not appropriate for processing by [facility]:       - LAST COMMENTED THERPY COMPLETED        Medication Therapy Plan: LAST COMMENTED THERPY COMPLETED(07/18/15-NANCI); LAST PRESCRIBED BY SRI GRIJALVA; PLEASE ROUTE TO COVERING PROVIDER IN PCP'S ABSENCE, DEFER TO YOU  Medication reconciliation completed: No      Automatic Epic Generated Protocol Data:    Requested Prescriptions   Pending Prescriptions Disp Refills    RABEprazole (ACIPHEX) 20 mg tablet 30 tablet 11     Sig: Take 1 tablet (20 mg total) by mouth once daily.       Gastroenterology: Proton Pump Inhibitors 2 Passed - 6/25/2020 10:46 AM        Passed - Patient is at least 18 years old        Passed - Osteoporosis is not on problem list        Passed - Negative Pregnancy Status Check        Passed - An appropriate indication is on the problem list        Passed - Office visit in past 6 months or future 90 days.     Recent Outpatient Visits            4 months ago Lumbar radiculopathy    [place] [facility] Kosta Henning MD    4 months ago Lumbar radiculopathy    [facility] Shawn Kiran MD    4 months ago Lumbar radiculopathy    [facility] Shawn Kiran MD    11 months ago Epigastric pain    [facility] Sri Grijalva, NP    1 year ago Extensor tendinitis of foot - Left Foot    [facility] Patria Modi, DPM          Future Appointments              In 1 month Debbi Romano DO [facility], [place]                      Appointments  past 12m or future 3m with PCP    Date Provider   Last Visit   2/11/2020 Shawn Kiran MD   Next Visit   Visit date not found Shawn Kiran MD   ED visits in past 90 days: 0     Note composed:12:54 PM 06/25/2020

## 2020-06-26 RX ORDER — RABEPRAZOLE SODIUM 20 MG/1
TABLET, DELAYED RELEASE ORAL
Qty: 90 TABLET | Refills: 0 | Status: SHIPPED | OUTPATIENT
Start: 2020-06-26 | End: 2020-06-26 | Stop reason: SDUPTHER

## 2020-06-26 RX ORDER — RABEPRAZOLE SODIUM 20 MG/1
20 TABLET, DELAYED RELEASE ORAL DAILY
Qty: 90 TABLET | Refills: 0 | Status: SHIPPED | OUTPATIENT
Start: 2020-06-26 | End: 2020-12-16

## 2020-06-26 NOTE — TELEPHONE ENCOUNTER
Please consider refilling pt's med due to provider's absence. Refill pended. Please advise.  LOV 2/11/20

## 2020-07-27 ENCOUNTER — PATIENT OUTREACH (OUTPATIENT)
Dept: ADMINISTRATIVE | Facility: OTHER | Age: 40
End: 2020-07-27

## 2020-07-27 NOTE — PROGRESS NOTES
Requested updates within Care Everywhere.  Patient's chart was reviewed for overdue KALPESH topics.  Immunizations reconciled.

## 2020-10-01 DIAGNOSIS — B02.8 HERPES ZOSTER WITH COMPLICATION: ICD-10-CM

## 2020-10-01 NOTE — TELEPHONE ENCOUNTER
Care Due:                  Date            Visit Type   Department     Provider  --------------------------------------------------------------------------------                                ESTABLISHED                              PATIENT      [facility] FAMILY  Last Visit: 02-      [facility]       DANILO CHRISTINE  Next Visit: None Scheduled  None         None Found                                                            Last  Test          Frequency    Reason                     Performed    Due Date  --------------------------------------------------------------------------------    Cr..........  12 months..  valACYclovir.............  07- 07-    WBC.........  12 months..  valACYclovir.............  07- 07-    Powered by [employer]. Reference number: [id number]. 10/01/2020 11:35:48 AM   CDT

## 2020-10-02 RX ORDER — VALACYCLOVIR HYDROCHLORIDE 1 G/1
1000 TABLET, FILM COATED ORAL DAILY
Qty: 90 TABLET | Refills: 0 | Status: SHIPPED | OUTPATIENT
Start: 2020-10-02 | End: 2021-10-02

## 2020-10-02 NOTE — PROGRESS NOTES
Provider Staff:     Please schedule patient for Labs (CBC/CMP)    Thanks!  [facility]     Appointments  past 12m or future 3m with PCP    Date Provider   Last Visit   2/11/2020 Shawn Kiran MD   Next Visit   Visit date not found Shawn Kiran MD     Note composed:7:55 AM 10/02/2020

## 2020-10-02 NOTE — PROGRESS NOTES
Refill Authorization Note   Haley Saravia is requesting a refill authorization.     Brief assessment and rationale for refill: APPROVE: needs labs     Medication-related problems identified: Requires labs    Medication Therapy Plan: CDMR. NTBO(CMP/CBC)    Medication reconciliation completed: No                    Comments:      Orders Placed This Encounter    Creatinine, Serum    CBC Without Differential    valACYclovir (VALTREX) 1000 MG tablet      Requested Prescriptions   Signed Prescriptions Disp Refills    valACYclovir (VALTREX) 1000 MG tablet 90 tablet 0     Sig: Take 1 tablet (1,000 mg total) by mouth once daily.       Antimicrobials:  Oral Antiviral Agents - Anti-Herpetic Failed - 10/1/2020 11:35 AM        Failed - Cr is 1.3 or below and within 360 days     Creatinine   Date Value Ref Range Status   07/23/2019 1.3 0.5 - 1.4 mg/dL Final   04/21/2018 0.8 0.5 - 1.4 mg/dL Final   02/02/2016 0.8 0.5 - 1.4 mg/dL Final              Failed - WBC in normal range and within 360 days     WBC   Date Value Ref Range Status   07/23/2019 9.14 3.90 - 12.70 K/uL Final   04/21/2018 8.47 3.90 - 12.70 K/uL Final   09/29/2016 8.24 3.90 - 12.70 K/uL Final              Failed - eGFR within 360 days     eGFR if non    Date Value Ref Range Status   07/23/2019 51.1 (A) >60 mL/min/1.73 m^2 Final     Comment:     Calculation used to obtain the estimated glomerular filtration  rate (eGFR) is the CKD-EPI equation.      04/21/2018 >60.0 >60 mL/min/1.73 m^2 Final     Comment:     Calculation used to obtain the estimated glomerular filtration  rate (eGFR) is the CKD-EPI equation.      02/02/2016 >60.0 >60 mL/min/1.73 m^2 Final     Comment:     Calculation used to obtain the estimated glomerular filtration  rate (eGFR) is the CKD-EPI equation. Since race is unknown   in our information system, the eGFR values for   -American and Non--American patients are given   for each creatinine result.       eGFR if     Date Value Ref Range Status   07/23/2019 58.9 (A) >60 mL/min/1.73 m^2 Final   04/21/2018 >60.0 >60 mL/min/1.73 m^2 Final   02/02/2016 >60.0 >60 mL/min/1.73 m^2 Final              Passed - Patient is at least 18 years old        Passed - Negative Pregnancy Status Check        Passed - Office Visit within last 12 months or future 90 days.     Recent Outpatient Visits            7 months ago Lumbar radiculopathy    [place] [facility] Kosta Henning MD    7 months ago Lumbar radiculopathy    [facility] Shawn Kiran MD    8 months ago Lumbar radiculopathy    [facility] Shawn Kiran MD    1 year ago Epigastric pain    [facility] Marii Grijalva NP    2 years ago Extensor tendinitis of foot - Left Foot    [place] [facility] Patria Modi, DPM                        Appointments  past 12m or future 3m with PCP    Date Provider   Last Visit   2/11/2020 Shawn Kiran MD   Next Visit   Visit date not found Shawn Kiran MD   ED visits in past 90 days: 0     Note composed:7:54 AM 10/02/2020

## 2020-10-09 DIAGNOSIS — Z12.31 OTHER SCREENING MAMMOGRAM: ICD-10-CM

## 2020-12-16 DIAGNOSIS — R11.0 NAUSEA: ICD-10-CM

## 2020-12-16 DIAGNOSIS — B02.8 HERPES ZOSTER WITH COMPLICATION: Primary | ICD-10-CM

## 2020-12-16 DIAGNOSIS — R14.2 BELCHING: ICD-10-CM

## 2020-12-16 DIAGNOSIS — R10.13 EPIGASTRIC PAIN: ICD-10-CM

## 2020-12-16 DIAGNOSIS — R63.0 ANOREXIA: ICD-10-CM

## 2020-12-16 DIAGNOSIS — K21.9 GASTROESOPHAGEAL REFLUX DISEASE: ICD-10-CM

## 2020-12-16 RX ORDER — RABEPRAZOLE SODIUM 20 MG/1
TABLET, DELAYED RELEASE ORAL
Qty: 90 TABLET | Refills: 0 | Status: SHIPPED | OUTPATIENT
Start: 2020-12-16 | End: 2021-04-01 | Stop reason: SDUPTHER

## 2020-12-16 NOTE — TELEPHONE ENCOUNTER
Care Due:                  Date            Visit Type   Department     Provider  --------------------------------------------------------------------------------                                ESTABLISHED                              PATIENT      [facility] FAM[facility]  Last Visit: 02-      [facility]       DANILO CHRISTINE  Next Visit: None Scheduled  None         None Found                                                            Last  Test          Frequency    Reason                     Performed    Due Date  --------------------------------------------------------------------------------    Office Visit  12 months..  RABEprazole, valACYclovir  02- 02-    CBC.........  12 months..  valACYclovir.............  07- 07-    Cr..........  12 months..  valACYclovir.............  07- 07-    Powered by [employer]. Reference number: [id number]. 12/16/2020 12:16:37 AM   CST

## 2020-12-16 NOTE — PROGRESS NOTES
Refill Authorization Note   Haley Saravia  is requesting a refill authorization.  Brief Assessment and Rationale for Refill:  Approve    -Medication-Related Problems Identified:   Requires labs  Requires appointment  Medication Therapy Plan:  CDMR. NEEDS APPT(ANNUAL). LABS(CMP, CBC) ; ACCEPTABLE TO USE H2RA AND PPI TOGETHER PER ORC    Medication Reconciliation Completed: No   Comments:       Requested Prescriptions   Pending Prescriptions Disp Refills    RABEprazole (ACIPHEX) 20 mg tablet [Pharmacy Med Name: RABEPRAZOLE SOD DR 20 MG TAB] 90 tablet 0     Sig: TAKE 1 TABLET BY MOUTH EVERY DAY       Gastroenterology: Proton Pump Inhibitors 2 Passed - 12/16/2020  2:32 PM        Passed - Patient is at least 18 years old        Passed - Osteoporosis is not on problem list        Passed - Negative Pregnancy Status Check        Passed - An appropriate indication is on the problem list        Passed - Office visit in past 12 months or future 90 days     Recent Outpatient Visits            10 months ago Lumbar radiculopathy    [place] [facility] Kosta Henning MD    10 months ago Lumbar radiculopathy    [facility] Shawn Kiran MD    10 months ago Lumbar radiculopathy    [facility] Shawn Kiran MD    1 year ago Epigastric pain    [facility] Marii Grijalva NP    2 years ago Extensor tendinitis of foot - Left Foot    [facility] Patria Modi DPM                        Appointments  past 12m or future 3m with PCP    Date Provider   Last Visit   2/11/2020 Shawn Kiran MD   Next Visit   Visit date not found Shawn Kiran MD   ED visits in past 90 days: 0     Note composed:2:36 PM 12/16/2020

## 2020-12-16 NOTE — LETTER
December 17, 2020    Haley Callie Saravia  [address]  [place] [address]             [facility]  [address]  [place] [address]  Phone: [phone]  Fax: [phone] Dear MsMarianela Rani:    In response to your recent request for medication refills, Dr. Kiran  asked that we reach out to you regarding the need for Annual and Labs (CMP, CBC)    Orders for the Labs mentioned above  are available for scheduling, and your appointment can be made by calling [phone].  Any one of our scheduling specialists can assist you.          If you have any questions or concerns, please don't hesitate to call.    Sincerely,        Marina Laughlin LPN     
Normal

## 2020-12-16 NOTE — TELEPHONE ENCOUNTER
Provider Staff:     Action is required for this patient.     Please schedule patient for Annual and Labs (CMP, CBC)    Thanks!  [facility]     Appointments  past 12m or future 3m with PCP    Date Provider   Last Visit   2/11/2020 Shawn Kiran MD   Next Visit   Visit date not found Shawn Kiran MD     Note composed:2:40 PM 12/16/2020

## 2021-02-17 ENCOUNTER — OFFICE VISIT (OUTPATIENT)
Dept: FAMILY MEDICINE | Facility: CLINIC | Age: 41
End: 2021-02-17
Payer: COMMERCIAL

## 2021-02-17 DIAGNOSIS — R51.9 CHRONIC INTRACTABLE HEADACHE, UNSPECIFIED HEADACHE TYPE: Primary | ICD-10-CM

## 2021-02-17 DIAGNOSIS — G89.29 CHRONIC INTRACTABLE HEADACHE, UNSPECIFIED HEADACHE TYPE: Primary | ICD-10-CM

## 2021-02-17 PROCEDURE — 99214 OFFICE O/P EST MOD 30 MIN: CPT | Mod: 95,,, | Performed by: INTERNAL MEDICINE

## 2021-02-17 PROCEDURE — 99214 PR OFFICE/OUTPT VISIT, EST, LEVL IV, 30-39 MIN: ICD-10-PCS | Mod: 95,,, | Performed by: INTERNAL MEDICINE

## 2021-02-17 RX ORDER — SUMATRIPTAN 50 MG/1
TABLET, FILM COATED ORAL
Qty: 10 TABLET | Refills: 0 | Status: SHIPPED | OUTPATIENT
Start: 2021-02-17 | End: 2021-04-01

## 2021-02-17 RX ORDER — ONDANSETRON 4 MG/1
TABLET, FILM COATED ORAL
Qty: 30 TABLET | Refills: 0 | Status: SHIPPED | OUTPATIENT
Start: 2021-02-17

## 2021-02-20 ENCOUNTER — LAB VISIT (OUTPATIENT)
Dept: LAB | Facility: HOSPITAL | Age: 41
End: 2021-02-20
Attending: INTERNAL MEDICINE
Payer: COMMERCIAL

## 2021-02-20 DIAGNOSIS — G89.29 CHRONIC INTRACTABLE HEADACHE, UNSPECIFIED HEADACHE TYPE: ICD-10-CM

## 2021-02-20 DIAGNOSIS — R51.9 CHRONIC INTRACTABLE HEADACHE, UNSPECIFIED HEADACHE TYPE: ICD-10-CM

## 2021-02-20 LAB
ALBUMIN SERPL BCP-MCNC: 4.2 G/DL (ref 3.5–5.2)
ALP SERPL-CCNC: 43 U/L (ref 55–135)
ALT SERPL W/O P-5'-P-CCNC: 29 U/L (ref 10–44)
ANION GAP SERPL CALC-SCNC: 12 MMOL/L (ref 8–16)
AST SERPL-CCNC: 22 U/L (ref 10–40)
BASOPHILS # BLD AUTO: 0.09 K/UL (ref 0–0.2)
BASOPHILS NFR BLD: 0.9 % (ref 0–1.9)
BILIRUB SERPL-MCNC: 0.3 MG/DL (ref 0.1–1)
BUN SERPL-MCNC: 13 MG/DL (ref 6–20)
CALCIUM SERPL-MCNC: 9.1 MG/DL (ref 8.7–10.5)
CHLORIDE SERPL-SCNC: 104 MMOL/L (ref 95–110)
CO2 SERPL-SCNC: 22 MMOL/L (ref 23–29)
CREAT SERPL-MCNC: 1 MG/DL (ref 0.5–1.4)
DIFFERENTIAL METHOD: ABNORMAL
EOSINOPHIL # BLD AUTO: 0.2 K/UL (ref 0–0.5)
EOSINOPHIL NFR BLD: 1.9 % (ref 0–8)
ERYTHROCYTE [DISTWIDTH] IN BLOOD BY AUTOMATED COUNT: 11.8 % (ref 11.5–14.5)
EST. GFR  (AFRICAN AMERICAN): >60 ML/MIN/1.73 M^2
EST. GFR  (NON AFRICAN AMERICAN): >60 ML/MIN/1.73 M^2
GLUCOSE SERPL-MCNC: 91 MG/DL (ref 70–110)
HCT VFR BLD AUTO: 39.4 % (ref 37–48.5)
HGB BLD-MCNC: 13.5 G/DL (ref 12–16)
IMM GRANULOCYTES # BLD AUTO: 0.03 K/UL (ref 0–0.04)
IMM GRANULOCYTES NFR BLD AUTO: 0.3 % (ref 0–0.5)
LYMPHOCYTES # BLD AUTO: 3.5 K/UL (ref 1–4.8)
LYMPHOCYTES NFR BLD: 36.2 % (ref 18–48)
MCH RBC QN AUTO: 32.8 PG (ref 27–31)
MCHC RBC AUTO-ENTMCNC: 34.3 G/DL (ref 32–36)
MCV RBC AUTO: 96 FL (ref 82–98)
MONOCYTES # BLD AUTO: 0.6 K/UL (ref 0.3–1)
MONOCYTES NFR BLD: 6.5 % (ref 4–15)
NEUTROPHILS # BLD AUTO: 5.2 K/UL (ref 1.8–7.7)
NEUTROPHILS NFR BLD: 54.2 % (ref 38–73)
NRBC BLD-RTO: 0 /100 WBC
PLATELET # BLD AUTO: 361 K/UL (ref 150–350)
PMV BLD AUTO: 10.5 FL (ref 9.2–12.9)
POTASSIUM SERPL-SCNC: 4.3 MMOL/L (ref 3.5–5.1)
PROT SERPL-MCNC: 7.4 G/DL (ref 6–8.4)
RBC # BLD AUTO: 4.12 M/UL (ref 4–5.4)
SODIUM SERPL-SCNC: 138 MMOL/L (ref 136–145)
T4 FREE SERPL-MCNC: 0.91 NG/DL (ref 0.71–1.51)
TSH SERPL DL<=0.005 MIU/L-ACNC: 9.92 UIU/ML (ref 0.4–4)
WBC # BLD AUTO: 9.54 K/UL (ref 3.9–12.7)

## 2021-02-20 PROCEDURE — 80053 COMPREHEN METABOLIC PANEL: CPT

## 2021-02-20 PROCEDURE — 85025 COMPLETE CBC W/AUTO DIFF WBC: CPT

## 2021-02-20 PROCEDURE — 84439 ASSAY OF FREE THYROXINE: CPT

## 2021-02-20 PROCEDURE — 36415 COLL VENOUS BLD VENIPUNCTURE: CPT | Mod: PO

## 2021-02-20 PROCEDURE — 84443 ASSAY THYROID STIM HORMONE: CPT

## 2021-02-21 ENCOUNTER — PATIENT MESSAGE (OUTPATIENT)
Dept: FAMILY MEDICINE | Facility: CLINIC | Age: 41
End: 2021-02-21

## 2021-02-21 DIAGNOSIS — E03.9 ACQUIRED HYPOTHYROIDISM: Primary | ICD-10-CM

## 2021-02-21 RX ORDER — LEVOTHYROXINE SODIUM 50 UG/1
50 TABLET ORAL
Qty: 30 TABLET | Refills: 11 | Status: SHIPPED | OUTPATIENT
Start: 2021-02-21 | End: 2022-02-03

## 2021-02-22 ENCOUNTER — TELEPHONE (OUTPATIENT)
Dept: FAMILY MEDICINE | Facility: CLINIC | Age: 41
End: 2021-02-22

## 2021-04-01 ENCOUNTER — PATIENT MESSAGE (OUTPATIENT)
Dept: FAMILY MEDICINE | Facility: CLINIC | Age: 41
End: 2021-04-01

## 2021-04-01 DIAGNOSIS — R51.9 CHRONIC INTRACTABLE HEADACHE, UNSPECIFIED HEADACHE TYPE: ICD-10-CM

## 2021-04-01 DIAGNOSIS — G89.29 CHRONIC INTRACTABLE HEADACHE, UNSPECIFIED HEADACHE TYPE: ICD-10-CM

## 2021-04-01 RX ORDER — SUMATRIPTAN 50 MG/1
TABLET, FILM COATED ORAL
Qty: 9 TABLET | Refills: 1 | Status: SHIPPED | OUTPATIENT
Start: 2021-04-01 | End: 2021-05-28

## 2021-04-03 ENCOUNTER — TELEPHONE (OUTPATIENT)
Dept: FAMILY MEDICINE | Facility: CLINIC | Age: 41
End: 2021-04-03

## 2021-04-05 ENCOUNTER — LAB VISIT (OUTPATIENT)
Dept: LAB | Facility: HOSPITAL | Age: 41
End: 2021-04-05
Attending: INTERNAL MEDICINE
Payer: COMMERCIAL

## 2021-04-05 DIAGNOSIS — E03.9 ACQUIRED HYPOTHYROIDISM: ICD-10-CM

## 2021-04-05 PROCEDURE — 84443 ASSAY THYROID STIM HORMONE: CPT | Performed by: INTERNAL MEDICINE

## 2021-04-05 PROCEDURE — 36415 COLL VENOUS BLD VENIPUNCTURE: CPT | Mod: PO | Performed by: INTERNAL MEDICINE

## 2021-04-06 LAB — TSH SERPL DL<=0.005 MIU/L-ACNC: 3.64 UIU/ML (ref 0.4–4)

## 2021-05-01 ENCOUNTER — PATIENT MESSAGE (OUTPATIENT)
Dept: FAMILY MEDICINE | Facility: CLINIC | Age: 41
End: 2021-05-01

## 2021-05-06 ENCOUNTER — OFFICE VISIT (OUTPATIENT)
Dept: FAMILY MEDICINE | Facility: CLINIC | Age: 41
End: 2021-05-06
Payer: COMMERCIAL

## 2021-05-06 VITALS
BODY MASS INDEX: 30.28 KG/M2 | SYSTOLIC BLOOD PRESSURE: 132 MMHG | WEIGHT: 193.31 LBS | HEART RATE: 87 BPM | OXYGEN SATURATION: 98 % | DIASTOLIC BLOOD PRESSURE: 88 MMHG

## 2021-05-06 DIAGNOSIS — E03.8 SUBCLINICAL HYPOTHYROIDISM: ICD-10-CM

## 2021-05-06 DIAGNOSIS — R23.2 HOT FLASHES: Primary | ICD-10-CM

## 2021-05-06 PROCEDURE — 3008F BODY MASS INDEX DOCD: CPT | Mod: CPTII,S$GLB,, | Performed by: INTERNAL MEDICINE

## 2021-05-06 PROCEDURE — 3008F PR BODY MASS INDEX (BMI) DOCUMENTED: ICD-10-PCS | Mod: CPTII,S$GLB,, | Performed by: INTERNAL MEDICINE

## 2021-05-06 PROCEDURE — 99999 PR PBB SHADOW E&M-EST. PATIENT-LVL IV: ICD-10-PCS | Mod: PBBFAC,,, | Performed by: INTERNAL MEDICINE

## 2021-05-06 PROCEDURE — 99213 PR OFFICE/OUTPT VISIT, EST, LEVL III, 20-29 MIN: ICD-10-PCS | Mod: S$GLB,,, | Performed by: INTERNAL MEDICINE

## 2021-05-06 PROCEDURE — 1126F PR PAIN SEVERITY QUANTIFIED, NO PAIN PRESENT: ICD-10-PCS | Mod: S$GLB,,, | Performed by: INTERNAL MEDICINE

## 2021-05-06 PROCEDURE — 99999 PR PBB SHADOW E&M-EST. PATIENT-LVL IV: CPT | Mod: PBBFAC,,, | Performed by: INTERNAL MEDICINE

## 2021-05-06 PROCEDURE — 1126F AMNT PAIN NOTED NONE PRSNT: CPT | Mod: S$GLB,,, | Performed by: INTERNAL MEDICINE

## 2021-05-06 PROCEDURE — 99213 OFFICE O/P EST LOW 20 MIN: CPT | Mod: S$GLB,,, | Performed by: INTERNAL MEDICINE

## 2021-05-30 ENCOUNTER — PATIENT OUTREACH (OUTPATIENT)
Dept: ADMINISTRATIVE | Facility: OTHER | Age: 41
End: 2021-05-30

## 2021-06-01 ENCOUNTER — OFFICE VISIT (OUTPATIENT)
Dept: OBSTETRICS AND GYNECOLOGY | Facility: CLINIC | Age: 41
End: 2021-06-01
Payer: COMMERCIAL

## 2021-06-01 VITALS
DIASTOLIC BLOOD PRESSURE: 62 MMHG | HEIGHT: 67 IN | WEIGHT: 191.56 LBS | SYSTOLIC BLOOD PRESSURE: 190 MMHG | BODY MASS INDEX: 30.07 KG/M2

## 2021-06-01 DIAGNOSIS — R23.2 HOT FLASHES: Primary | ICD-10-CM

## 2021-06-01 PROCEDURE — 99203 PR OFFICE/OUTPT VISIT, NEW, LEVL III, 30-44 MIN: ICD-10-PCS | Mod: S$GLB,,, | Performed by: OBSTETRICS & GYNECOLOGY

## 2021-06-01 PROCEDURE — 1126F AMNT PAIN NOTED NONE PRSNT: CPT | Mod: S$GLB,,, | Performed by: OBSTETRICS & GYNECOLOGY

## 2021-06-01 PROCEDURE — 1126F PR PAIN SEVERITY QUANTIFIED, NO PAIN PRESENT: ICD-10-PCS | Mod: S$GLB,,, | Performed by: OBSTETRICS & GYNECOLOGY

## 2021-06-01 PROCEDURE — 99999 PR PBB SHADOW E&M-EST. PATIENT-LVL IV: ICD-10-PCS | Mod: PBBFAC,,, | Performed by: OBSTETRICS & GYNECOLOGY

## 2021-06-01 PROCEDURE — 3008F BODY MASS INDEX DOCD: CPT | Mod: CPTII,S$GLB,, | Performed by: OBSTETRICS & GYNECOLOGY

## 2021-06-01 PROCEDURE — 3008F PR BODY MASS INDEX (BMI) DOCUMENTED: ICD-10-PCS | Mod: CPTII,S$GLB,, | Performed by: OBSTETRICS & GYNECOLOGY

## 2021-06-01 PROCEDURE — 99203 OFFICE O/P NEW LOW 30 MIN: CPT | Mod: S$GLB,,, | Performed by: OBSTETRICS & GYNECOLOGY

## 2021-06-01 PROCEDURE — 99999 PR PBB SHADOW E&M-EST. PATIENT-LVL IV: CPT | Mod: PBBFAC,,, | Performed by: OBSTETRICS & GYNECOLOGY

## 2021-06-01 RX ORDER — FLUCONAZOLE 150 MG/1
TABLET ORAL
COMMUNITY
Start: 2021-02-07

## 2021-06-01 RX ORDER — ESTRADIOL 0.03 MG/D
1 PATCH TRANSDERMAL
Qty: 4 PATCH | Refills: 11 | Status: CANCELLED | OUTPATIENT
Start: 2021-06-01 | End: 2022-06-01

## 2021-06-01 RX ORDER — ESTRADIOL 0.03 MG/D
1 FILM, EXTENDED RELEASE TRANSDERMAL
Qty: 8 PATCH | Refills: 11 | Status: SHIPPED | OUTPATIENT
Start: 2021-06-03 | End: 2021-06-21

## 2021-06-01 RX ORDER — BUTALBITAL, ACETAMINOPHEN AND CAFFEINE 50; 325; 40 MG/1; MG/1; MG/1
TABLET ORAL
COMMUNITY

## 2021-08-03 ENCOUNTER — LAB VISIT (OUTPATIENT)
Dept: LAB | Facility: HOSPITAL | Age: 41
End: 2021-08-03
Attending: INTERNAL MEDICINE
Payer: COMMERCIAL

## 2021-08-03 DIAGNOSIS — E03.8 SUBCLINICAL HYPOTHYROIDISM: ICD-10-CM

## 2021-08-03 LAB — TSH SERPL DL<=0.005 MIU/L-ACNC: 2.06 UIU/ML (ref 0.4–4)

## 2021-08-03 PROCEDURE — 84443 ASSAY THYROID STIM HORMONE: CPT | Performed by: INTERNAL MEDICINE

## 2021-08-03 PROCEDURE — 36415 COLL VENOUS BLD VENIPUNCTURE: CPT | Mod: PO | Performed by: INTERNAL MEDICINE

## 2021-09-30 DIAGNOSIS — R10.13 EPIGASTRIC PAIN: ICD-10-CM

## 2021-09-30 DIAGNOSIS — K21.9 GASTROESOPHAGEAL REFLUX DISEASE: ICD-10-CM

## 2021-09-30 DIAGNOSIS — R11.0 NAUSEA: ICD-10-CM

## 2021-09-30 DIAGNOSIS — R14.2 BELCHING: ICD-10-CM

## 2021-09-30 DIAGNOSIS — R63.0 ANOREXIA: ICD-10-CM

## 2021-09-30 RX ORDER — RABEPRAZOLE SODIUM 20 MG/1
20 TABLET, DELAYED RELEASE ORAL DAILY
Qty: 90 TABLET | Refills: 0 | Status: SHIPPED | OUTPATIENT
Start: 2021-09-30 | End: 2021-12-21

## 2021-11-18 ENCOUNTER — HOSPITAL ENCOUNTER (OUTPATIENT)
Dept: RADIOLOGY | Facility: HOSPITAL | Age: 41
Discharge: HOME | End: 2021-11-18
Attending: FAMILY MEDICINE
Payer: COMMERCIAL

## 2021-11-18 DIAGNOSIS — Z12.31 OTHER SCREENING MAMMOGRAM: ICD-10-CM

## 2021-11-18 PROCEDURE — 77063 MAMMO DIGITAL SCREENING BILAT WITH TOMO: ICD-10-PCS | Mod: 26,,, | Performed by: RADIOLOGY

## 2021-11-18 PROCEDURE — 77067 MAMMO DIGITAL SCREENING BILAT WITH TOMO: ICD-10-PCS | Mod: 26,,, | Performed by: RADIOLOGY

## 2021-11-18 PROCEDURE — 77063 BREAST TOMOSYNTHESIS BI: CPT | Mod: 26,,, | Performed by: RADIOLOGY

## 2021-11-18 PROCEDURE — 77067 SCR MAMMO BI INCL CAD: CPT | Mod: TC,PO

## 2021-11-18 PROCEDURE — 77067 SCR MAMMO BI INCL CAD: CPT | Mod: 26,,, | Performed by: RADIOLOGY

## 2021-12-21 DIAGNOSIS — R11.0 NAUSEA: ICD-10-CM

## 2021-12-21 DIAGNOSIS — R63.0 ANOREXIA: ICD-10-CM

## 2021-12-21 DIAGNOSIS — R10.13 EPIGASTRIC PAIN: ICD-10-CM

## 2021-12-21 DIAGNOSIS — R14.2 BELCHING: ICD-10-CM

## 2021-12-21 DIAGNOSIS — K21.9 GASTROESOPHAGEAL REFLUX DISEASE: ICD-10-CM

## 2021-12-21 RX ORDER — RABEPRAZOLE SODIUM 20 MG/1
TABLET, DELAYED RELEASE ORAL
Qty: 90 TABLET | Refills: 0 | Status: SHIPPED | OUTPATIENT
Start: 2021-12-21 | End: 2022-03-15

## 2022-03-14 DIAGNOSIS — R63.0 ANOREXIA: ICD-10-CM

## 2022-03-14 DIAGNOSIS — R10.13 EPIGASTRIC PAIN: ICD-10-CM

## 2022-03-14 DIAGNOSIS — R11.0 NAUSEA: ICD-10-CM

## 2022-03-14 DIAGNOSIS — K21.9 GASTROESOPHAGEAL REFLUX DISEASE: ICD-10-CM

## 2022-03-14 DIAGNOSIS — R14.2 BELCHING: ICD-10-CM

## 2022-03-14 NOTE — TELEPHONE ENCOUNTER

## 2022-03-14 NOTE — TELEPHONE ENCOUNTER
No new care gaps identified.  Powered by [employer] by [employer]. Reference number: [id number].   3/14/2022 12:34:41 AM CDT

## 2022-03-15 RX ORDER — RABEPRAZOLE SODIUM 20 MG/1
TABLET, DELAYED RELEASE ORAL
Qty: 90 TABLET | Refills: 3 | Status: SHIPPED | OUTPATIENT
Start: 2022-03-15 | End: 2023-03-06

## 2022-05-31 ENCOUNTER — PATIENT MESSAGE (OUTPATIENT)
Dept: ADMINISTRATIVE | Facility: HOSPITAL | Age: 42
End: 2022-05-31
Payer: COMMERCIAL

## 2022-12-07 ENCOUNTER — PATIENT OUTREACH (OUTPATIENT)
Dept: ADMINISTRATIVE | Facility: HOSPITAL | Age: 42
End: 2022-12-07
Payer: COMMERCIAL

## 2022-12-07 ENCOUNTER — PATIENT MESSAGE (OUTPATIENT)
Dept: ADMINISTRATIVE | Facility: HOSPITAL | Age: 42
End: 2022-12-07
Payer: COMMERCIAL

## 2022-12-07 DIAGNOSIS — Z12.31 OTHER SCREENING MAMMOGRAM: ICD-10-CM

## 2022-12-07 NOTE — PROGRESS NOTES
WEEKLY BULK ORDER REPORT.  ACTIVE PATIENT PORTAL MESSAGE OR LETTER SENT  BREAST CANCER SCREENING    Outreach to patient in reference to SCHEDULING A MAMMOGRAM EXAM.     Chart review completed:   Care Everywhere and Media reports - updates requested and reviewed.

## 2023-01-12 ENCOUNTER — HOSPITAL ENCOUNTER (OUTPATIENT)
Dept: RADIOLOGY | Facility: HOSPITAL | Age: 43
Discharge: HOME | End: 2023-01-12
Attending: FAMILY MEDICINE
Payer: COMMERCIAL

## 2023-01-12 DIAGNOSIS — Z12.31 OTHER SCREENING MAMMOGRAM: ICD-10-CM

## 2023-01-12 PROCEDURE — 77067 SCR MAMMO BI INCL CAD: CPT | Mod: TC,PO

## 2023-01-12 PROCEDURE — 77063 MAMMO DIGITAL SCREENING BILAT WITH TOMO: ICD-10-PCS | Mod: 26,,, | Performed by: RADIOLOGY

## 2023-01-12 PROCEDURE — 77067 MAMMO DIGITAL SCREENING BILAT WITH TOMO: ICD-10-PCS | Mod: 26,,, | Performed by: RADIOLOGY

## 2023-01-12 PROCEDURE — 77067 SCR MAMMO BI INCL CAD: CPT | Mod: 26,,, | Performed by: RADIOLOGY

## 2023-01-12 PROCEDURE — 77063 BREAST TOMOSYNTHESIS BI: CPT | Mod: 26,,, | Performed by: RADIOLOGY

## 2023-09-13 DIAGNOSIS — R11.0 NAUSEA: ICD-10-CM

## 2023-09-13 DIAGNOSIS — R10.13 EPIGASTRIC PAIN: ICD-10-CM

## 2023-09-13 DIAGNOSIS — R14.2 BELCHING: ICD-10-CM

## 2023-09-13 DIAGNOSIS — R63.0 ANOREXIA: ICD-10-CM

## 2023-09-13 DIAGNOSIS — K21.9 GASTROESOPHAGEAL REFLUX DISEASE: ICD-10-CM

## 2023-09-13 RX ORDER — RABEPRAZOLE SODIUM 20 MG/1
TABLET, DELAYED RELEASE ORAL
Qty: 90 TABLET | Refills: 1 | Status: SHIPPED | OUTPATIENT
Start: 2023-09-13

## 2024-01-20 ENCOUNTER — HOSPITAL ENCOUNTER (OUTPATIENT)
Dept: RADIOLOGY | Facility: HOSPITAL | Age: 44
Discharge: HOME | End: 2024-01-20
Attending: FAMILY MEDICINE
Payer: COMMERCIAL

## 2024-01-20 DIAGNOSIS — Z12.31 ENCOUNTER FOR SCREENING MAMMOGRAM FOR BREAST CANCER: ICD-10-CM

## 2024-01-20 PROCEDURE — 77067 SCR MAMMO BI INCL CAD: CPT | Mod: 26,,, | Performed by: RADIOLOGY

## 2024-01-20 PROCEDURE — 77063 BREAST TOMOSYNTHESIS BI: CPT | Mod: 26,,, | Performed by: RADIOLOGY

## 2024-01-20 PROCEDURE — 77067 SCR MAMMO BI INCL CAD: CPT | Mod: TC,PO

## 2025-01-25 ENCOUNTER — HOSPITAL ENCOUNTER (OUTPATIENT)
Dept: RADIOLOGY | Facility: HOSPITAL | Age: 45
Discharge: HOME | End: 2025-01-25
Attending: NURSE PRACTITIONER
Payer: COMMERCIAL

## 2025-01-25 VITALS — WEIGHT: 130 LBS | HEIGHT: 67 IN | BODY MASS INDEX: 20.4 KG/M2

## 2025-01-25 DIAGNOSIS — Z12.31 ENCOUNTER FOR SCREENING MAMMOGRAM FOR MALIGNANT NEOPLASM OF BREAST: ICD-10-CM

## 2025-01-25 PROCEDURE — 77063 BREAST TOMOSYNTHESIS BI: CPT | Mod: 26,,, | Performed by: RADIOLOGY

## 2025-01-25 PROCEDURE — 77063 BREAST TOMOSYNTHESIS BI: CPT | Mod: TC,PO

## 2025-01-25 PROCEDURE — 77067 SCR MAMMO BI INCL CAD: CPT | Mod: 26,,, | Performed by: RADIOLOGY

## 2025-05-23 ENCOUNTER — TELEPHONE (OUTPATIENT)
Dept: HEMATOLOGY/ONCOLOGY | Facility: CLINIC | Age: 45
End: 2025-05-23
Payer: COMMERCIAL

## 2025-07-22 ENCOUNTER — OFFICE VISIT (OUTPATIENT)
Dept: PODIATRY | Facility: CLINIC | Age: 45
End: 2025-07-22
Payer: COMMERCIAL

## 2025-07-22 VITALS — BODY MASS INDEX: 20.41 KG/M2 | WEIGHT: 130.06 LBS | HEIGHT: 67 IN

## 2025-07-22 DIAGNOSIS — G89.29 CHRONIC TOE PAIN, BILATERAL: Primary | ICD-10-CM

## 2025-07-22 DIAGNOSIS — M79.675 CHRONIC TOE PAIN, BILATERAL: Primary | ICD-10-CM

## 2025-07-22 DIAGNOSIS — M79.674 CHRONIC TOE PAIN, BILATERAL: Primary | ICD-10-CM

## 2025-07-22 DIAGNOSIS — L60.0 INGROWN NAIL: ICD-10-CM

## 2025-07-22 PROCEDURE — 99203 OFFICE O/P NEW LOW 30 MIN: CPT | Mod: S$GLB,,, | Performed by: PODIATRIST

## 2025-07-22 PROCEDURE — 1159F MED LIST DOCD IN RCRD: CPT | Mod: CPTII,S$GLB,, | Performed by: PODIATRIST

## 2025-07-22 PROCEDURE — 3008F BODY MASS INDEX DOCD: CPT | Mod: CPTII,S$GLB,, | Performed by: PODIATRIST

## 2025-07-22 PROCEDURE — 99999 PR PBB SHADOW E&M-EST. PATIENT-LVL III: CPT | Mod: PBBFAC,,, | Performed by: PODIATRIST

## 2025-07-22 RX ORDER — LEVOTHYROXINE, LIOTHYRONINE 19; 4.5 UG/1; UG/1
30 TABLET ORAL EVERY MORNING
COMMUNITY
Start: 2025-04-15

## 2025-07-22 RX ORDER — VORICONAZOLE 200 MG/1
200 TABLET, FILM COATED ORAL EVERY 12 HOURS
COMMUNITY
Start: 2025-01-27

## 2025-07-22 NOTE — PROGRESS NOTES
Subjective:     Patient ID: Haley Saravia is a 45 y.o. female.    Chief Complaint: Ingrown Toenail (B/L great toes,, medial border pain in l/toe worst, was infected 2 mths ago used iodine)    Haley is a 45 y.o. female with a past medical history of Abnormal Pap smear, Abnormal Pap smear of cervix, Acne, Dyspareunia, Encounter for long-term (current) use of other medications, GERD (gastroesophageal reflux disease), Pelvic pain in female, Post herpetic neuralgia, and Thyroid disease. The patient's chief complaint consists of bilateral great toe pain.  Notes sensitivity along the medial borders of bilateral hallux toenail after time spent weight bearing.  States they were infected two months ago, with infection successfully treated with iodine.  Denies noticing any recent signs of infection.  Inquires as to possible treatment options.  Denies any additional pedal complaints.      Past Medical History:   Diagnosis Date    Abnormal Pap smear          Abnormal Pap smear of cervix     Acne     Dyspareunia     Encounter for long-term (current) use of other medications     GERD (gastroesophageal reflux disease)     Pelvic pain in female     LLQ    Post herpetic neuralgia     Thyroid disease        Past Surgical History:   Procedure Laterality Date    CYSTOSCOPY  1/11/2010    Dr Kendell Carty - [facility]    ESOPHAGOGASTRODUODENOSCOPY  6/05/2009    Dr Mark Caballero - [facility]    HYSTERECTOMY  1/2015    LEEP PROCEDURE      SALPINGOOPHORECTOMY  1/11/2010    Left - Dr Kendell Carty - [facility]    TONSILLECTOMY  07/10/2008    Dr DIANA Avila - [facility]       Family History   Problem Relation Name Age of Onset    No Known Problems Mother      Heart disease Father      No Known Problems Sister      No Known Problems Sister      No Known Problems Daughter      No Known Problems Maternal Aunt      No Known Problems Maternal Uncle      No Known Problems Paternal Aunt      No Known Problems Paternal Uncle      No Known Problems Maternal Grandmother       No Known Problems Maternal Grandfather      No Known Problems Paternal Grandmother      No Known Problems Paternal Grandfather      No Known Problems Other      Breast cancer Neg Hx      Ovarian cancer Neg Hx      BRCA 1/2 Neg Hx         Social History     Socioeconomic History    Marital status:     Number of children: 1   Occupational History    Occupation: unemployed   Tobacco Use    Smoking status: Former     Current packs/day: 0.50     Average packs/day: 0.5 packs/day for 3.0 years (1.5 ttl pk-yrs)     Types: Cigarettes    Smokeless tobacco: Never   Substance and Sexual Activity    Alcohol use: Yes     Comment: socially    Drug use: No    Sexual activity: Yes     Partners: Male     Birth control/protection: See Surgical Hx   Social History Narrative    Exercise: 3 days a week with        Current Medications[1]    Review of patient's allergies indicates:   Allergen Reactions    Celebrex [celecoxib] Rash    Ceftin [cefuroxime axetil]     Codeine Diarrhea    Corticosteroids (glucocorticoids)     Medrol [methylprednisolone] Other (See Comments)     Uncontrollable coughing fits        Review of Systems   Constitutional: Negative for chills and fever.   Skin:  Positive for color change and nail changes.   Musculoskeletal:  Negative for joint pain, joint swelling and muscle cramps.   Gastrointestinal:  Negative for nausea and vomiting.   Neurological:  Negative for numbness and paresthesias.   Psychiatric/Behavioral:  Negative for altered mental status.         Objective:     Physical Exam  Constitutional:       Appearance: Normal appearance. She is not ill-appearing.   Cardiovascular:      Pulses:           Dorsalis pedis pulses are 2+ on the right side and 2+ on the left side.        Posterior tibial pulses are 2+ on the right side and 2+ on the left side.      Comments: CFT is < 3 seconds bilateral.  Pedal hair growth is present bilateral.  No lower extremity edema noted bilateral.  Toes are warm  to touch bilateral.    Musculoskeletal:         General: Tenderness present. No deformity or signs of injury.      Right lower leg: No edema.      Left lower leg: No edema.      Comments: Muscle strength 5/5 in all muscle groups bilateral.  No tenderness nor crepitation with ROM of foot/ankle joints bilateral.  Pain with palpation to the distal medial and lateral borders of bilateral hallux toenail.     Skin:     Capillary Refill: Capillary refill takes 2 to 3 seconds.      Findings: No bruising, ecchymosis, erythema, signs of injury, laceration, lesion, petechiae, rash or wound.      Comments: Pedal skin has normal turgor, temperature, and texture bilateral.  Incurvation noted to both borders of bilateral hallux toenail.  No localized sign of infection noted.  Remaining toenails x 8 appear normotrophic. Examination of the skin reveals no evidence of significant maceration, rashes, open lesions, suspicious appearing nevi or other concerning lesions.       Neurological:      General: No focal deficit present.      Mental Status: She is alert.      Sensory: No sensory deficit.      Motor: No weakness or atrophy.      Comments: Light touch is intact bilateral.             Assessment:      Encounter Diagnoses   Name Primary?    Chronic toe pain, bilateral Yes    Ingrown nail      Plan:     Haley was seen today for ingrown toenail.    Diagnoses and all orders for this visit:    Chronic toe pain, bilateral    Ingrown nail      I counseled the patient on her conditions, their implications and medical management.    Based on today's exam, patient has stable bilateral hallux ingrown toenails.    Discussed performing a partial matrixectomy of both borders of said nails should symptoms become unbearable.    In the meantime, I recommend applying ebanel lotion and an occlusive dressing to both borders QD x 1 month.  This should soften keratin of both the skin and nail minimizing/resolving sensitivity.      If this fails to  improve symptoms, we will consider the surgical option.  Patient to relay efficacy of the recommended treatment option via mychart in 4 weeks.    RTC prn.    Garrison Peres DPM         [1]   Current Outpatient Medications   Medication Sig Dispense Refill    multivitamin (THERAGRAN) per tablet Take 1 tablet by mouth once daily.      NP THYROID 30 mg Tab Take 30 mg by mouth every morning.      RABEprazole (ACIPHEX) 20 mg tablet TAKE 1 TABLET BY MOUTH EVERY DAY 90 tablet 1    spironolactone (ALDACTONE) 50 MG tablet       vitamin E 400 UNIT capsule Take 400 Units by mouth once daily.      voriconazole (VFEND) 200 MG Tab Take 200 mg by mouth every 12 (twelve) hours.      butalbital-acetaminophen-caffeine -40 mg (FIORICET, ESGIC) -40 mg per tablet butalbital-acetaminophen-caffeine 50 mg-325 mg-40 mg tablet      estradioL (VIVELLE-DOT) 0.025 mg/24 hr APPLY 1 PATCH TWICE A WEEK 24 patch 4    fluconazole (DIFLUCAN) 150 MG Tab TAKE 1 TABLET BY MOUTH EVERY 5 DAYS AS NEEDED FOR 15 DAYS, START WHEN SYMPTOMS BEGIN (Patient not taking: Reported on 7/22/2025)      HYDROcodone-acetaminophen (NORCO) 5-325 mg per tablet Take 1 tablet by mouth every 8 (eight) hours as needed for Pain. (Patient not taking: Reported on 7/22/2025) 9 tablet 0    levothyroxine (SYNTHROID) 50 MCG tablet TAKE 1 TABLET (50 MCG TOTAL) BY MOUTH BEFORE BREAKFAST. 90 tablet 1    ondansetron (ZOFRAN) 4 MG tablet Daily p.r.n. for nausea associated with headaches. (Patient not taking: Reported on 5/6/2021) 30 tablet 0    sumatriptan (IMITREX) 50 MG tablet TAKE 1 TABLET AT THE 1ST SIGN OF HEADACHE CAN REPEAT IN 1-2 HOURS. MAX 2 / DAY AND 10/ MONTH. 9 tablet 1    valACYclovir (VALTREX) 1000 MG tablet Take 1 tablet (1,000 mg total) by mouth once daily. 90 tablet 0     No current facility-administered medications for this visit.

## 2025-08-06 ENCOUNTER — LAB VISIT (OUTPATIENT)
Dept: LAB | Facility: HOSPITAL | Age: 45
End: 2025-08-06
Attending: INTERNAL MEDICINE
Payer: COMMERCIAL

## 2025-08-06 ENCOUNTER — OFFICE VISIT (OUTPATIENT)
Dept: HEMATOLOGY/ONCOLOGY | Facility: CLINIC | Age: 45
End: 2025-08-06
Payer: COMMERCIAL

## 2025-08-06 VITALS
TEMPERATURE: 98 F | SYSTOLIC BLOOD PRESSURE: 115 MMHG | HEIGHT: 67 IN | RESPIRATION RATE: 17 BRPM | BODY MASS INDEX: 20.17 KG/M2 | DIASTOLIC BLOOD PRESSURE: 80 MMHG | OXYGEN SATURATION: 96 % | WEIGHT: 128.5 LBS | HEART RATE: 88 BPM

## 2025-08-06 DIAGNOSIS — R53.83 OTHER FATIGUE: ICD-10-CM

## 2025-08-06 DIAGNOSIS — M19.90 INFLAMMATORY ARTHROPATHY: ICD-10-CM

## 2025-08-06 DIAGNOSIS — D64.9 ANEMIA, UNSPECIFIED TYPE: ICD-10-CM

## 2025-08-06 DIAGNOSIS — R79.89 HIGH SERUM FERRITIN: Primary | ICD-10-CM

## 2025-08-06 LAB
CRP SERPL-MCNC: <0.3 MG/L
ERYTHROCYTE [SEDIMENTATION RATE] IN BLOOD BY PHOTOMETRIC METHOD: 8 MM/HR

## 2025-08-06 PROCEDURE — 84238 ASSAY NONENDOCRINE RECEPTOR: CPT

## 2025-08-06 PROCEDURE — 86038 ANTINUCLEAR ANTIBODIES: CPT

## 2025-08-06 PROCEDURE — 3079F DIAST BP 80-89 MM HG: CPT | Mod: CPTII,S$GLB,, | Performed by: INTERNAL MEDICINE

## 2025-08-06 PROCEDURE — 86140 C-REACTIVE PROTEIN: CPT

## 2025-08-06 PROCEDURE — 36415 COLL VENOUS BLD VENIPUNCTURE: CPT | Mod: PN

## 2025-08-06 PROCEDURE — 99204 OFFICE O/P NEW MOD 45 MIN: CPT | Mod: S$GLB,,, | Performed by: INTERNAL MEDICINE

## 2025-08-06 PROCEDURE — 3074F SYST BP LT 130 MM HG: CPT | Mod: CPTII,S$GLB,, | Performed by: INTERNAL MEDICINE

## 2025-08-06 PROCEDURE — 85652 RBC SED RATE AUTOMATED: CPT

## 2025-08-06 PROCEDURE — 99999 PR PBB SHADOW E&M-EST. PATIENT-LVL IV: CPT | Mod: PBBFAC,,, | Performed by: INTERNAL MEDICINE

## 2025-08-06 PROCEDURE — 3008F BODY MASS INDEX DOCD: CPT | Mod: CPTII,S$GLB,, | Performed by: INTERNAL MEDICINE

## 2025-08-06 PROCEDURE — 1159F MED LIST DOCD IN RCRD: CPT | Mod: CPTII,S$GLB,, | Performed by: INTERNAL MEDICINE

## 2025-08-06 NOTE — PROGRESS NOTES
Name: Haley Saravia  MRN:  [id number]  :  1980 Age 45 y.o.  Date of Service: 2025    Reason for visit:  Haley Saravia is a 45 y.o. female here regarding the medical problem listed below:    Hematology History/History of Present Illness:   Patient is a self referral for reported polycythemia.  She has had a previous work up with Dr. Sharma.   Hereditary Hemochromatosis panel   C282Y neg   H63D heterozygous     JAK2 neg   CALR neg   MPL neg     She reports she has ongoing arthralgias when her iron is high and reports feeling heavy and has itching.  She has had 3 phlebotomies over the last 6 months and now has a Hb of 10 and is reporting intense fatigue.    Reports she takes a lot of multivitamins   Had a partial hysterectomy due to pelvic pain 10 years ago.     Social:  and has an adult daughter who is currently living in [place] with plans to move to [place] soon.  She does not work, no smoking, rare ETOH.        Past Medical History:   Diagnosis Date    Abnormal Pap smear          Abnormal Pap smear of cervix     Acne     Dyspareunia     Encounter for long-term (current) use of other medications     GERD (gastroesophageal reflux disease)     Pelvic pain in female     LLQ    Post herpetic neuralgia     Thyroid disease        Past Surgical History:   Procedure Laterality Date    CYSTOSCOPY  2010    Dr Kendell Carty - [facility]    ESOPHAGOGASTRODUODENOSCOPY  2009    Dr Mark Caballero - [facility]    HYSTERECTOMY  2015    LEEP PROCEDURE      SALPINGOOPHORECTOMY  2010    Left - Dr Kendell Carty - [facility]    TONSILLECTOMY  07/10/2008    Dr DIANA Avila - [facility]       Allergies as of 2025 - Reviewed 2025   Allergen Reaction Noted    Celebrex [celecoxib] Rash 2021    Ceftin [cefuroxime axetil]  10/16/2021    Codeine Diarrhea 2016    Corticosteroids (glucocorticoids)  10/16/2021    Medrol [methylprednisolone] Other (See Comments) 2016       Family History   Problem  "Relation Name Age of Onset    No Known Problems Mother      Heart disease Father      No Known Problems Sister      No Known Problems Sister      No Known Problems Daughter      No Known Problems Maternal Aunt      No Known Problems Maternal Uncle      No Known Problems Paternal Aunt      No Known Problems Paternal Uncle      No Known Problems Maternal Grandmother      No Known Problems Maternal Grandfather      No Known Problems Paternal Grandmother      No Known Problems Paternal Grandfather      No Known Problems Other      Breast cancer Neg Hx      Ovarian cancer Neg Hx      BRCA 1/2 Neg Hx         Social History[1]      PHYSICAL EXAMINATION:  /80 (BP Location: Right arm, Patient Position: Sitting)   Pulse 88   Temp 98 °F (36.7 °C) (Temporal)   Resp 17   Ht 5' 7" (1.702 m)   Wt 58.3 kg (128 lb 8.5 oz)   LMP 12/08/2014   SpO2 96%   BMI 20.13 kg/m²   Wt Readings from Last 3 Encounters:   08/06/25 58.3 kg (128 lb 8.5 oz)   07/22/25 59 kg (130 lb 1.1 oz)   07/03/25 59 kg (130 lb)     ECOG PERFORMANCE STATUS: 1  Physical Exam   General:  Well-appearing, nontoxic  Eyes:  Equal and round pupils, EOMI, no scleral icterus  Mouth:  No lesions, moist  Cardiovascular:  Warm, well-perfused, no peripheral edema  Lungs:  Unlabored on room air, no wheezing  Neurologic:  Awake, alert and oriented, participating in the exam  Psych:  Appropriate mood and affect  Skin:  Normal pallor, No rashes  Heme:  No petechiae, no purpura      LABORATORY:  CBC  Lab Results   Component Value Date    WBC 12.62 07/03/2025    HGB 10.3 (L) 07/03/2025    HCT 29.1 (L) 07/03/2025    MCV 92 07/03/2025     07/03/2025       BMP  Lab Results   Component Value Date     (L) 07/03/2025    K 4.0 07/03/2025     07/03/2025    CO2 19 (L) 07/03/2025    BUN 8 07/03/2025    CREATININE 0.87 07/03/2025    CALCIUM 9.1 07/03/2025    ANIONGAP 12 07/03/2025    ESTGFRAFRICA >60 10/16/2021    EGFRNONAA >60 10/16/2021     LFT  Lab Results "   Component Value Date    ALT 14 07/03/2025    AST 22 07/03/2025    ALKPHOS 55 07/03/2025    BILITOT 0.2 07/03/2025           ASSESSMENT AND PLAN:  Haley Saravia is a 45 y.o. female with...    #High Ferritin  -workup for HH as above with heterozygosity of H63D   -Observation of iron overload in 63D heterozygotes may represent association rather than causation These individuals may have another HFE variant, a pathogenic variant in another gene (that which is not tested), or iron overload from another cause such as excess alcohol consumption.  In cases of iron overload in a H63D heterozygote, without evident acquired causes of iron overload, disease variants in other genes may be suspected. Chronic liver diseases such as non-alcoholic fatty liver disease (NAFLD), chronic viral hepatitis, porphyria cutanea tarda (PCT), or chronic hepatitis from other causes may also lead to increased liver iron. In patients with chronic liver diseases including alcoholic liver disease, viral hepatitis, or PCT, phlebotomy improves the iron deposition     The normal range for ferritin in plasma or serum is approximately 40 to 200 ng/mL.  A ferritin level >=200 to 300 ng/mL (>=200 to 300 mcg/L) in a man or >=150 to 200 ng/mL (>=150 to 200 mcg/L) in a woman is consistent with iron overload, and a level below these values is good evidence that the patient does not have iron overload. She barely had a ferritin over 200 which is encouraging and supports a gentle approach to phlebotomy will likely be sufficient.     #Diffuse Arthralgias   -I wouldn't expect her to have arthalgias secondary to a ferritin of 200 thus this maybe something else such as autoimmune disorder?  She reports ongoing dry mouth as well.  Getting inflammatory markers and reflex MARCE.     Estefani Nash M.D.  Hematology/Oncology     In accordance with the 21st Century Cures Act, patients are now granted immediate electronic access to their medical records. This note  is primarily intended for communication among medical professionals and as a personal reflection to myself of differential diagnoses, prior and current treatments, and potential plans. As a result, it may incorporate medical terminology, abbreviations, or language that could appear blunt or unfamiliar.     Route Chart for Scheduling    Med Onc Chart Routing      Follow up with physician 6 months. with repeat cbc cmp ferritin and iron panel   Follow up with [facility]    Infusion scheduling note    Injection scheduling note    Labs   Scheduling:  Preferred lab:  Lab interval:  Collect ordered labs today   Imaging    Pharmacy appointment    Other referrals                          [1]   Social History  Tobacco Use    Smoking status: Former     Current packs/day: 0.50     Average packs/day: 0.5 packs/day for 3.0 years (1.5 ttl pk-yrs)     Types: Cigarettes    Smokeless tobacco: Never   Substance Use Topics    Alcohol use: Yes     Comment: socially    Drug use: No

## 2025-08-07 LAB — ANA (OHS): NORMAL

## 2025-08-08 LAB — STFR SERPL-MCNC: 2.5 MG/L (ref 1.8–4.6)
